# Patient Record
Sex: FEMALE | Race: BLACK OR AFRICAN AMERICAN | Employment: UNEMPLOYED | URBAN - METROPOLITAN AREA
[De-identification: names, ages, dates, MRNs, and addresses within clinical notes are randomized per-mention and may not be internally consistent; named-entity substitution may affect disease eponyms.]

---

## 2020-05-12 ENCOUNTER — APPOINTMENT (OUTPATIENT)
Dept: GENERAL RADIOLOGY | Age: 25
DRG: 053 | End: 2020-05-12
Attending: EMERGENCY MEDICINE
Payer: MEDICAID

## 2020-05-12 ENCOUNTER — HOSPITAL ENCOUNTER (INPATIENT)
Age: 25
LOS: 5 days | Discharge: HOME | DRG: 053 | End: 2020-05-17
Attending: EMERGENCY MEDICINE | Admitting: HOSPITALIST
Payer: MEDICAID

## 2020-05-12 ENCOUNTER — APPOINTMENT (OUTPATIENT)
Dept: CT IMAGING | Age: 25
DRG: 053 | End: 2020-05-12
Attending: EMERGENCY MEDICINE
Payer: MEDICAID

## 2020-05-12 DIAGNOSIS — G40.909 SEIZURE DISORDER (HCC): Primary | ICD-10-CM

## 2020-05-12 DIAGNOSIS — A41.9 SEPSIS, DUE TO UNSPECIFIED ORGANISM, UNSPECIFIED WHETHER ACUTE ORGAN DYSFUNCTION PRESENT (HCC): ICD-10-CM

## 2020-05-12 DIAGNOSIS — G40.919 BREAKTHROUGH SEIZURE (HCC): ICD-10-CM

## 2020-05-12 LAB
ALBUMIN SERPL-MCNC: 4.3 G/DL (ref 3.4–5)
ALBUMIN/GLOB SERPL: 1.2 {RATIO} (ref 0.8–1.7)
ALP SERPL-CCNC: 71 U/L (ref 45–117)
ALT SERPL-CCNC: 31 U/L (ref 13–56)
AMPHET UR QL SCN: NEGATIVE
ANION GAP SERPL CALC-SCNC: 12 MMOL/L (ref 3–18)
APAP SERPL-MCNC: <2 UG/ML (ref 10–30)
APPEARANCE CSF: COLORLESS
APPEARANCE UR: ABNORMAL
AST SERPL-CCNC: 94 U/L (ref 10–38)
BACTERIA URNS QL MICRO: ABNORMAL /HPF
BARBITURATES UR QL SCN: NEGATIVE
BASOPHILS # BLD: 0 K/UL (ref 0–0.1)
BASOPHILS NFR BLD: 0 % (ref 0–2)
BENZODIAZ UR QL: NEGATIVE
BILIRUB SERPL-MCNC: 0.6 MG/DL (ref 0.2–1)
BILIRUB UR QL: NEGATIVE
BUN SERPL-MCNC: 16 MG/DL (ref 7–18)
BUN/CREAT SERPL: 13 (ref 12–20)
CALCIUM SERPL-MCNC: 8.9 MG/DL (ref 8.5–10.1)
CANNABINOIDS UR QL SCN: POSITIVE
CHLORIDE SERPL-SCNC: 110 MMOL/L (ref 100–111)
CK MB CFR SERPL CALC: 0.1 % (ref 0–4)
CK MB SERPL-MCNC: 6.5 NG/ML (ref 5–25)
CK SERPL-CCNC: ABNORMAL U/L (ref 26–192)
CO2 SERPL-SCNC: 18 MMOL/L (ref 21–32)
COCAINE UR QL SCN: NEGATIVE
COLOR CSF: CLEAR
COLOR UR: YELLOW
CREAT SERPL-MCNC: 1.28 MG/DL (ref 0.6–1.3)
DIFFERENTIAL METHOD BLD: ABNORMAL
EOSINOPHIL # BLD: 0 K/UL (ref 0–0.4)
EOSINOPHIL NFR BLD: 0 % (ref 0–5)
EPITH CASTS URNS QL MICRO: ABNORMAL /LPF (ref 0–5)
ERYTHROCYTE [DISTWIDTH] IN BLOOD BY AUTOMATED COUNT: 14.3 % (ref 11.6–14.5)
ETHANOL SERPL-MCNC: <3 MG/DL (ref 0–3)
GLOBULIN SER CALC-MCNC: 3.6 G/DL (ref 2–4)
GLUCOSE CSF-MCNC: 75 MG/DL (ref 40–70)
GLUCOSE SERPL-MCNC: 84 MG/DL (ref 74–99)
GLUCOSE UR STRIP.AUTO-MCNC: NEGATIVE MG/DL
HCG SERPL QL: NEGATIVE
HCT VFR BLD AUTO: 41.9 % (ref 35–45)
HDSCOM,HDSCOM: ABNORMAL
HGB BLD-MCNC: 13.1 G/DL (ref 12–16)
HGB UR QL STRIP: ABNORMAL
KETONES UR QL STRIP.AUTO: 15 MG/DL
LACTATE SERPL-SCNC: 2.2 MMOL/L (ref 0.4–2)
LACTATE SERPL-SCNC: 3.1 MMOL/L (ref 0.4–2)
LEUKOCYTE ESTERASE UR QL STRIP.AUTO: NEGATIVE
LYMPHOCYTES # BLD: 2.8 K/UL (ref 0.9–3.6)
LYMPHOCYTES NFR BLD: 10 % (ref 21–52)
MCH RBC QN AUTO: 29 PG (ref 24–34)
MCHC RBC AUTO-ENTMCNC: 31.3 G/DL (ref 31–37)
MCV RBC AUTO: 92.7 FL (ref 74–97)
METHADONE UR QL: NEGATIVE
MONOCYTES # BLD: 0.9 K/UL (ref 0.05–1.2)
MONOCYTES NFR BLD: 3 % (ref 3–10)
MUCOUS THREADS URNS QL MICRO: ABNORMAL /LPF
NEUTS SEG # BLD: 24.3 K/UL (ref 1.8–8)
NEUTS SEG NFR BLD: 87 % (ref 40–73)
NITRITE UR QL STRIP.AUTO: NEGATIVE
OPIATES UR QL: NEGATIVE
PCP UR QL: NEGATIVE
PH UR STRIP: 5 [PH] (ref 5–8)
PLATELET # BLD AUTO: 346 K/UL (ref 135–420)
PMV BLD AUTO: 9.8 FL (ref 9.2–11.8)
POTASSIUM SERPL-SCNC: 4.2 MMOL/L (ref 3.5–5.5)
PROT CSF-MCNC: 43 MG/DL (ref 15–45)
PROT SERPL-MCNC: 7.9 G/DL (ref 6.4–8.2)
PROT UR STRIP-MCNC: ABNORMAL MG/DL
RBC # BLD AUTO: 4.52 M/UL (ref 4.2–5.3)
RBC # CSF: 40 /CU MM
RBC #/AREA URNS HPF: ABNORMAL /HPF (ref 0–5)
SALICYLATES SERPL-MCNC: 2.7 MG/DL (ref 2.8–20)
SODIUM SERPL-SCNC: 140 MMOL/L (ref 136–145)
SP GR UR REFRACTOMETRY: 1.01 (ref 1–1.03)
TROPONIN I SERPL-MCNC: 0.21 NG/ML (ref 0–0.04)
TUBE # CSF: 1
TUBE # CSF: 1
TUBE # CSF: 3
URATE CRY URNS QL MICRO: ABNORMAL
UROBILINOGEN UR QL STRIP.AUTO: 0.2 EU/DL (ref 0.2–1)
WBC # BLD AUTO: 28 K/UL (ref 4.6–13.2)
WBC # CSF: 1 /CU MM
WBC URNS QL MICRO: ABNORMAL /HPF (ref 0–4)

## 2020-05-12 PROCEDURE — 71045 X-RAY EXAM CHEST 1 VIEW: CPT

## 2020-05-12 PROCEDURE — 87635 SARS-COV-2 COVID-19 AMP PRB: CPT

## 2020-05-12 PROCEDURE — 96366 THER/PROPH/DIAG IV INF ADDON: CPT

## 2020-05-12 PROCEDURE — 87086 URINE CULTURE/COLONY COUNT: CPT

## 2020-05-12 PROCEDURE — 87205 SMEAR GRAM STAIN: CPT

## 2020-05-12 PROCEDURE — 80053 COMPREHEN METABOLIC PANEL: CPT

## 2020-05-12 PROCEDURE — 70450 CT HEAD/BRAIN W/O DYE: CPT

## 2020-05-12 PROCEDURE — 87040 BLOOD CULTURE FOR BACTERIA: CPT

## 2020-05-12 PROCEDURE — 65660000001 HC RM ICU INTERMED STEPDOWN

## 2020-05-12 PROCEDURE — 84703 CHORIONIC GONADOTROPIN ASSAY: CPT

## 2020-05-12 PROCEDURE — 84157 ASSAY OF PROTEIN OTHER: CPT

## 2020-05-12 PROCEDURE — 009U3ZX DRAINAGE OF SPINAL CANAL, PERCUTANEOUS APPROACH, DIAGNOSTIC: ICD-10-PCS | Performed by: INTERNAL MEDICINE

## 2020-05-12 PROCEDURE — 82550 ASSAY OF CK (CPK): CPT

## 2020-05-12 PROCEDURE — 83605 ASSAY OF LACTIC ACID: CPT

## 2020-05-12 PROCEDURE — 80177 DRUG SCRN QUAN LEVETIRACETAM: CPT

## 2020-05-12 PROCEDURE — 96376 TX/PRO/DX INJ SAME DRUG ADON: CPT

## 2020-05-12 PROCEDURE — 96375 TX/PRO/DX INJ NEW DRUG ADDON: CPT

## 2020-05-12 PROCEDURE — 80307 DRUG TEST PRSMV CHEM ANLYZR: CPT

## 2020-05-12 PROCEDURE — 77003 FLUOROGUIDE FOR SPINE INJECT: CPT

## 2020-05-12 PROCEDURE — 96372 THER/PROPH/DIAG INJ SC/IM: CPT

## 2020-05-12 PROCEDURE — 74011000258 HC RX REV CODE- 258: Performed by: EMERGENCY MEDICINE

## 2020-05-12 PROCEDURE — 93005 ELECTROCARDIOGRAM TRACING: CPT

## 2020-05-12 PROCEDURE — 96361 HYDRATE IV INFUSION ADD-ON: CPT

## 2020-05-12 PROCEDURE — 74011000250 HC RX REV CODE- 250: Performed by: EMERGENCY MEDICINE

## 2020-05-12 PROCEDURE — 74011250636 HC RX REV CODE- 250/636: Performed by: HOSPITALIST

## 2020-05-12 PROCEDURE — 74011000258 HC RX REV CODE- 258: Performed by: HOSPITALIST

## 2020-05-12 PROCEDURE — 81001 URINALYSIS AUTO W/SCOPE: CPT

## 2020-05-12 PROCEDURE — 74011250637 HC RX REV CODE- 250/637: Performed by: EMERGENCY MEDICINE

## 2020-05-12 PROCEDURE — 74011250636 HC RX REV CODE- 250/636: Performed by: EMERGENCY MEDICINE

## 2020-05-12 PROCEDURE — 82945 GLUCOSE OTHER FLUID: CPT

## 2020-05-12 PROCEDURE — 99285 EMERGENCY DEPT VISIT HI MDM: CPT

## 2020-05-12 PROCEDURE — 85025 COMPLETE CBC W/AUTO DIFF WBC: CPT

## 2020-05-12 PROCEDURE — 96365 THER/PROPH/DIAG IV INF INIT: CPT

## 2020-05-12 PROCEDURE — 89050 BODY FLUID CELL COUNT: CPT

## 2020-05-12 RX ORDER — LIDOCAINE HYDROCHLORIDE 10 MG/ML
10 INJECTION, SOLUTION EPIDURAL; INFILTRATION; INTRACAUDAL; PERINEURAL
Status: COMPLETED | OUTPATIENT
Start: 2020-05-12 | End: 2020-05-12

## 2020-05-12 RX ORDER — LORAZEPAM 2 MG/ML
2 INJECTION INTRAMUSCULAR
Status: COMPLETED | OUTPATIENT
Start: 2020-05-12 | End: 2020-05-12

## 2020-05-12 RX ORDER — DEXTROSE MONOHYDRATE AND SODIUM CHLORIDE 5; .45 G/100ML; G/100ML
75 INJECTION, SOLUTION INTRAVENOUS CONTINUOUS
Status: CANCELLED | OUTPATIENT
Start: 2020-05-12

## 2020-05-12 RX ORDER — HEPARIN SODIUM 1000 [USP'U]/ML
4000 INJECTION, SOLUTION INTRAVENOUS; SUBCUTANEOUS ONCE
Status: DISCONTINUED | OUTPATIENT
Start: 2020-05-12 | End: 2020-05-12

## 2020-05-12 RX ORDER — VANCOMYCIN 2 GRAM/500 ML IN 0.9 % SODIUM CHLORIDE INTRAVENOUS
2000 ONCE
Status: COMPLETED | OUTPATIENT
Start: 2020-05-12 | End: 2020-05-12

## 2020-05-12 RX ORDER — LORAZEPAM 2 MG/ML
INJECTION INTRAMUSCULAR
Status: DISPENSED
Start: 2020-05-12 | End: 2020-05-12

## 2020-05-12 RX ORDER — SODIUM CHLORIDE 0.9 % (FLUSH) 0.9 %
5-40 SYRINGE (ML) INJECTION AS NEEDED
Status: DISCONTINUED | OUTPATIENT
Start: 2020-05-12 | End: 2020-05-14 | Stop reason: SDUPTHER

## 2020-05-12 RX ORDER — LORAZEPAM 2 MG/ML
1 INJECTION INTRAMUSCULAR
Status: COMPLETED | OUTPATIENT
Start: 2020-05-12 | End: 2020-05-12

## 2020-05-12 RX ORDER — ACETAMINOPHEN 325 MG/1
650 TABLET ORAL
Status: DISCONTINUED | OUTPATIENT
Start: 2020-05-12 | End: 2020-05-17 | Stop reason: HOSPADM

## 2020-05-12 RX ORDER — ACETAMINOPHEN 650 MG/1
650 SUPPOSITORY RECTAL
Status: COMPLETED | OUTPATIENT
Start: 2020-05-12 | End: 2020-05-12

## 2020-05-12 RX ORDER — HEPARIN SODIUM 10000 [USP'U]/100ML
12-25 INJECTION, SOLUTION INTRAVENOUS
Status: DISCONTINUED | OUTPATIENT
Start: 2020-05-12 | End: 2020-05-12

## 2020-05-12 RX ORDER — LORAZEPAM 2 MG/ML
4 INJECTION INTRAMUSCULAR
Status: DISCONTINUED | OUTPATIENT
Start: 2020-05-12 | End: 2020-05-17 | Stop reason: HOSPADM

## 2020-05-12 RX ORDER — SODIUM CHLORIDE 0.9 % (FLUSH) 0.9 %
5-40 SYRINGE (ML) INJECTION EVERY 8 HOURS
Status: DISCONTINUED | OUTPATIENT
Start: 2020-05-12 | End: 2020-05-17 | Stop reason: HOSPADM

## 2020-05-12 RX ORDER — ENOXAPARIN SODIUM 100 MG/ML
40 INJECTION SUBCUTANEOUS EVERY 24 HOURS
Status: DISCONTINUED | OUTPATIENT
Start: 2020-05-12 | End: 2020-05-17 | Stop reason: HOSPADM

## 2020-05-12 RX ORDER — DEXTROSE MONOHYDRATE AND SODIUM CHLORIDE 5; .9 G/100ML; G/100ML
150 INJECTION, SOLUTION INTRAVENOUS CONTINUOUS
Status: DISCONTINUED | OUTPATIENT
Start: 2020-05-12 | End: 2020-05-17 | Stop reason: HOSPADM

## 2020-05-12 RX ORDER — SODIUM CHLORIDE 0.9 % (FLUSH) 0.9 %
5-10 SYRINGE (ML) INJECTION AS NEEDED
Status: DISCONTINUED | OUTPATIENT
Start: 2020-05-12 | End: 2020-05-17 | Stop reason: HOSPADM

## 2020-05-12 RX ORDER — DEXAMETHASONE SODIUM PHOSPHATE 4 MG/ML
10 INJECTION, SOLUTION INTRA-ARTICULAR; INTRALESIONAL; INTRAMUSCULAR; INTRAVENOUS; SOFT TISSUE
Status: COMPLETED | OUTPATIENT
Start: 2020-05-12 | End: 2020-05-12

## 2020-05-12 RX ADMIN — LIDOCAINE HYDROCHLORIDE 5 ML: 10 INJECTION, SOLUTION EPIDURAL; INFILTRATION; INTRACAUDAL; PERINEURAL at 17:25

## 2020-05-12 RX ADMIN — LORAZEPAM 1 MG: 2 INJECTION, SOLUTION INTRAMUSCULAR; INTRAVENOUS at 16:06

## 2020-05-12 RX ADMIN — LORAZEPAM 1 MG: 2 INJECTION, SOLUTION INTRAMUSCULAR; INTRAVENOUS at 15:57

## 2020-05-12 RX ADMIN — SODIUM CHLORIDE 1000 ML: 900 INJECTION, SOLUTION INTRAVENOUS at 14:43

## 2020-05-12 RX ADMIN — DEXAMETHASONE SODIUM PHOSPHATE 10 MG: 4 INJECTION, SOLUTION INTRAMUSCULAR; INTRAVENOUS at 14:51

## 2020-05-12 RX ADMIN — WATER 10 MG: 1 INJECTION INTRAMUSCULAR; INTRAVENOUS; SUBCUTANEOUS at 16:29

## 2020-05-12 RX ADMIN — DEXTROSE MONOHYDRATE AND SODIUM CHLORIDE 150 ML/HR: 5; .9 INJECTION, SOLUTION INTRAVENOUS at 18:04

## 2020-05-12 RX ADMIN — LORAZEPAM 2 MG: 2 INJECTION, SOLUTION INTRAMUSCULAR; INTRAVENOUS at 09:17

## 2020-05-12 RX ADMIN — SODIUM CHLORIDE 160 ML: 900 INJECTION, SOLUTION INTRAVENOUS at 14:43

## 2020-05-12 RX ADMIN — LEVETIRACETAM 250 MG: 100 INJECTION INTRAVENOUS at 18:48

## 2020-05-12 RX ADMIN — Medication 10 ML: at 22:34

## 2020-05-12 RX ADMIN — CEFTRIAXONE 2 G: 2 INJECTION, POWDER, FOR SOLUTION INTRAMUSCULAR; INTRAVENOUS at 14:50

## 2020-05-12 RX ADMIN — ACETAMINOPHEN 650 MG: 650 SUPPOSITORY RECTAL at 14:51

## 2020-05-12 RX ADMIN — VANCOMYCIN HYDROCHLORIDE 2000 MG: 10 INJECTION, POWDER, LYOPHILIZED, FOR SOLUTION INTRAVENOUS at 15:00

## 2020-05-12 RX ADMIN — LEVETIRACETAM 1000 MG: 100 INJECTION INTRAVENOUS at 09:26

## 2020-05-12 RX ADMIN — ENOXAPARIN SODIUM 40 MG: 40 INJECTION SUBCUTANEOUS at 23:51

## 2020-05-12 RX ADMIN — SODIUM CHLORIDE 1000 ML: 900 INJECTION, SOLUTION INTRAVENOUS at 18:54

## 2020-05-12 NOTE — PROCEDURES
Vascular & Interventional Radiology Brief Procedure Note    Interventional Radiologist: Cristin Sanchez    Pre-operative Diagnosis:  Seizures. Post-operative Diagnosis: Same as pre-op dx    Procedure(s) Performed:  Flouro guided LP    Anesthesia:  Local     Findings:  Clear colorless csf OP 25. Closing pressure: could not be obtained. Complications: None    Estimated Blood Loss:  minimal    Tubes and Drains: None    Specimens: limited specimen of only 2 cc as patient became agitated with needle in the spine. Procedure aborted prematurely. Condition: Good    Disposition:  Back to ER.  D/w Dr. Tony Yuan: Specimen sent to lab      Marylin Felty, MD  [address] [address]  Vascular & Interventional Radiology  5/12/2020

## 2020-05-12 NOTE — ED PROVIDER NOTES
EMERGENCY DEPARTMENT HISTORY AND PHYSICAL EXAM      Date: 5/12/2020  Patient Name: Prieto Zayas    History of Presenting Illness     Chief Complaint   Patient presents with    Seizure       History Provided By: Patient and EMS    Chief Complaint: Seizure    Additional History (Context): Prieto Zayas is a 22 y.o. female with seizure who presents with seizure at home this morning. History is provided by EMS, as upon arrival to the emergency department patient is postictal.  Per EMS report, patient has a known history of seizure disorder on Keppra and this morning had a generalized tonic-clonic seizure. Family called EMS, who noted that patient remained postictal during her transport. Upon arrival to the emergency department, patient was looking around but not answering questions and in a postictal state. Soon thereafter, she had another generalized tonic-clonic seizure and was given Ativan. PCP: None    Current Facility-Administered Medications   Medication Dose Route Frequency Provider Last Rate Last Dose    levETIRAcetam (KEPPRA) 1,000 mg in 0.9% sodium chloride 100 mL IVPB  1,000 mg IntraVENous Q12H Isa Rogers MD   1,000 mg at 05/12/20 0926    LORazepam (ATIVAN) 2 mg/mL injection             sodium chloride 0.9 % bolus infusion 1,000 mL  1,000 mL IntraVENous ONCE Isa Rogers MD         Current Outpatient Medications   Medication Sig Dispense Refill    levETIRAcetam (KEPPRA) 250 mg tablet Take  by mouth two (2) times a day. Past History     Past Medical History:  Past Medical History:   Diagnosis Date    [facility] [address]     Seizures ([facility] [address]        Past Surgical History:  History reviewed. No pertinent surgical history.     Family History:  Family History   Family history unknown: Yes       Social History:  Social History     Tobacco Use    Smoking status: Never Smoker    Smokeless tobacco: Never Used   Substance Use Topics    Alcohol use: Not Currently    Drug use: Not Currently Allergies:  No Known Allergies      Review of Systems   Review of Systems   Unable to perform ROS: Mental status change       Physical Exam     Vitals:    05/12/20 1045 05/12/20 1115 05/12/20 1130 05/12/20 1334   BP: 128/68 125/54 124/62 110/54   Pulse:    90   Resp:       Temp:       SpO2: 100%   97%     Physical Exam  Vitals signs and nursing note reviewed. Constitutional:       General: She is in acute distress. Appearance: She is well-developed and normal weight. She is not diaphoretic. Comments: Patient arrived to the emergency department in a postictal state, nonverbal however making purposeful movements with her extremities. Has dried blood on her face. HENT:      Head: Normocephalic and atraumatic. Mouth/Throat:      Mouth: Mucous membranes are moist.      Comments: Has some dried blood on her cheek and her left lateral tongue has evidence of a small bite wound with no current active bleeding. Eyes:      Conjunctiva/sclera: Conjunctivae normal.      Pupils: Pupils are equal, round, and reactive to light. Neck:      Musculoskeletal: Normal range of motion and neck supple. Cardiovascular:      Rate and Rhythm: Normal rate and regular rhythm. Heart sounds: Normal heart sounds. No murmur. Pulmonary:      Effort: Pulmonary effort is normal.      Breath sounds: Normal breath sounds. No wheezing or rales. Abdominal:      General: Bowel sounds are normal. There is no distension. Palpations: Abdomen is soft. Tenderness: There is no abdominal tenderness. Musculoskeletal: Normal range of motion. Lymphadenopathy:      Cervical: No cervical adenopathy. Skin:     General: Skin is warm and dry. Capillary Refill: Capillary refill takes less than 2 seconds. Neurological:      Comments: Postictal, nonverbal, moves all extremities symmetrically and responds to painful stimuli.            Diagnostic Study Results     Labs -     Recent Results (from the past 12 hour(s))   CBC WITH AUTOMATED DIFF    Collection Time: 05/12/20  9:36 AM   Result Value Ref Range    WBC 28.0 (H) 4.6 - 13.2 K/uL    RBC 4.52 4.20 - 5.30 M/uL    HGB 13.1 12.0 - 16.0 g/dL    HCT 41.9 35.0 - 45.0 %    MCV 92.7 74.0 - 97.0 FL    MCH 29.0 24.0 - 34.0 PG    MCHC 31.3 31.0 - 37.0 g/dL    RDW 14.3 11.6 - 14.5 %    PLATELET 586 858 - 605 K/uL    MPV 9.8 9.2 - 11.8 FL    NEUTROPHILS 87 (H) 40 - 73 %    LYMPHOCYTES 10 (L) 21 - 52 %    MONOCYTES 3 3 - 10 %    EOSINOPHILS 0 0 - 5 %    BASOPHILS 0 0 - 2 %    ABS. NEUTROPHILS 24.3 (H) 1.8 - 8.0 K/UL    ABS. LYMPHOCYTES 2.8 0.9 - 3.6 K/UL    ABS. MONOCYTES 0.9 0.05 - 1.2 K/UL    ABS. EOSINOPHILS 0.0 0.0 - 0.4 K/UL    ABS. BASOPHILS 0.0 0.0 - 0.1 K/UL    DF AUTOMATED     HCG QL SERUM    Collection Time: 05/12/20  9:36 AM   Result Value Ref Range    HCG, Ql. Negative NEG         Radiologic Studies -   CT HEAD WO CONT    (Results Pending)     CT Results  (Last 48 hours)    None        CXR Results  (Last 48 hours)    None            Medical Decision Making   I am the first provider for this patient. I reviewed the vital signs, available nursing notes, past medical history, past surgical history, family history and social history. Vital Signs-Reviewed the patient's vital signs. Records Reviewed: Nursing Notes and Old Medical Records    ED Course:   As above patient arrived to the emergency department in a postictal state, and soon after arrival had another witnessed generalized tonic-clonic seizure. She was given 2 mg of Ativan which stopped the seizure, and she was observed. She had the expected postictal and post Ativan somnolence, however after about an hour she began to wake up and moved all extremities symmetrically and rolled over in bed. There was a brief period of time where she was a little agitated trying to crawl out of bed, however when she was placed in a position of comfort, she then became calm and went to sleep.   However, at 1:30 PM I noted that she was still very drowsy and somnolent and is having a longer postictal phase than I would anticipate. It certainly could be that the Ativan is still affecting her mental status, however at this time I will broaden my diagnostic studies as well as order a head CT. Disposition:  Pending at time of turnover    2 PM : Pt care transferred to Dr. Bryanna Ayala  ,ED provider. History of patient complaint(s), available diagnostic reports and current treatment plan has been discussed thoroughly. Bedside rounding on patient occured : yes . Intended disposition of patient :  Pending. Pending diagnostics reports and/or labs (please list):  Non-contrast head CT, labs, and monitoring/re-evaluation of patient's mental status and return to baseline. Diagnosis     Clinical Impression:   1. Seizure disorder ([address]    2. Breakthrough seizure ([facility] [address]        Addendum: Prior to turnover, I ordered a rectal temperature check in addition to labs and CT scan. Rectal temperature check showed that the patient is febrile, so I will institute our sepsis protocol and treat her empirically for possible meningitis. Given that the patient has altered mental status, she will need CT scan prior to lumbar puncture. Depending on what her diagnostic evaluation shows, she may also need an EEG. Additionally, the patient is not following commands and continuously is pulling off her monitors and other medical devices, so we will place her in soft restraints. 2:11 PM - I suspect that this patient has an active infection. 2:11 PM - The patient met criteria for severe sepsis at this time.       PROVIDER SEPSIS PHYSICAL EXAM EVAL  Vital signs reviewed (see nursing documentation for further details):  Vitals:    20 1115 20 1130 20 1334 20 1405   BP: 125/54 124/62 110/54    Pulse:   90    Resp:       Temp:    (!) 100.7 °F (38.2 °C)   SpO2:   97%        Cardiac exam:Regular Rate    Pulmonary exam:Normal    Peripheral pulses:Normal    Capillary refill:Normal    Skin exam:pink    Exam performed Jordana Gardiner MD

## 2020-05-12 NOTE — PROGRESS NOTES
5/2/2020  I noted that no PCP is listed in chart. On pt's [employer], there lists PCP David Slade [phone]. Pateint is medicated and asleep- unable to ask her if she see's him. Ramone Chester.  Wisam Wesley, TOMIN  [facility]  [phone], Pager [phone]  [email or web address]

## 2020-05-12 NOTE — ED NOTES
Pt still combative and agitated in restraints. Unable to transport pt for CT at this time. Do not think pt will tolerate CT in current state. Provider made aware, no orders received at this time.

## 2020-05-12 NOTE — ED TRIAGE NOTES
Patient had witnessed seizure at home bit tongue patient arrives post ictal.   Had another seizure on arrival

## 2020-05-12 NOTE — ED NOTES
1400.  Patient now meets sirs criteria with having fever. Previous VS from being post ictal and dehydration. Sepsis order set started. Still no source identified at this time. 1430 patient signed out to me by Dr. Toan Timmons with bedside rounding performed. Concern patient has prolonged postictal state. She is awake moving around the bed but not answering questions appropriately. Does have abrasions to her forehead and nose. Dry mucus membranes. She has known history of seizures had to episodes have received 2 mg Ativan but has not woken up from it. Rectal temp now performed showing low-grade fever 100.7. Therefore Dr. Toan Timmons placed sepsis protocol order set.  1510. Patient reassessed. Still has not gone to CT. Paging IR.  1528. Spoke with IR Dr. Tyler Mcgrath He will assist in getting head CT and performing LP.  1533. Patient reassessed. Told me she needed to use the bathroom. Was awake and looked like she was trying to get out of the bed. Eric Jefferson to bedside to help patient.  1550. Patient reassessed. Sepsis reassessment performed. Elevated lactate secondary to lactic acidosis from her seizures. IV fluids were given. Will reorder second lactate to show improvement.  1600. Spoke with IR. They state they will take her to do the IR lumbar puncture just needs the orders placed. Will give additional sedation to help with the procedure. Patient already had blood cultures and received Rocephin and vancomycin. Patient received IV fluids. Patient will have lumbar puncture performed. Expect patient will admitted after that.    [phone]. Paging hospitalist.  For admission. Eric Jefferson spoke with the mother and states that she has not had any signs or symptoms of URI symptoms. However there have been several deaths in the family. Could be coronavirus. Patient placed in droplet plus precautions. Will admit patient for further evaluation and care. 1619. Eric Jefferson called from [address] [employer]. Patient still not holding still.   Will add addendum of Geodon. EKG done at 1418 read myself as sinus tachycardia at 115 bpm.  No ST elevation. Normal axis. Normal QT interval.      Impression:  Epileptic seizures with prolonged postictal state. Severe sepsis. Rhabdomyolysis with severe dehydration. Admitted in guarded and improved condition. Critical Care Time:  The services I provided to this patient were to treat and/or prevent clinically significant deterioration that could result in the failure of one or more body systems and/or organ systems. Services included the following:  -reviewing nursing notes and old charts  -vital sign assessments  -direct patient care  -medication orders and management  -interpreting and reviewing diagnostic studies/labs  -re-evaluations  -documentation time    Aggregate critical care time was 41 minutes, which includes only time during which I was engaged in work directly related to the patient's care as described above, whether I was at bedside or elsewhere in the Emergency Department. It did not include time spent performing other reported procedures or the services of residents, students, nurses, or advance practice providers.

## 2020-05-12 NOTE — H&P
Internal Medicine History and Physical          Subjective     HPI: Gissell Clinton is a 22 y.o. female with a PMHx of seizure disorder who presented to the ED with seizures. Unfortunately, the patient has been sedated quite heavily and thus unable to obtain ROS thus HPI obtained via ED staff, EMR. Apparently, the patient had seizure at home this morning. She was brought to ED and was postictal. She had another generalized tonic-clonic seizure in ED and given ativan. She remained somnolent but did come around enough to ask to go to bathroom. She was febrile and thus LP was ordered by ED physician. She was not tolerating CT head or LP thus sedated with IV ativan and geodon. She was given IVAB and tested for CoVID-19. There was apparently no history of URI symptoms but \"deaths in the family? \" No evidence of infectious process on preliminary workup. UDS was positive for THC. PMHx:  Past Medical History:   Diagnosis Date    [facility]iz[facility] ([facility] [address]     Seizures ([facility] [address]        PSurgHx:  History reviewed. No pertinent surgical history.     SocialHx:  Social History     Socioeconomic History    Marital status: SINGLE     Spouse name: Not on file    Number of children: Not on file    Years of education: Not on file    Highest education level: Not on file   Occupational History    Not on file   Social Needs    Financial resource strain: Not on file    Food insecurity     Worry: Not on file     Inability: Not on file    Transportation needs     Medical: Not on file     Non-medical: Not on file   Tobacco Use    Smoking status: Never Smoker    Smokeless tobacco: Never Used   Substance and Sexual Activity    Alcohol use: Not Currently    Drug use: Not Currently    Sexual activity: Not on file   Lifestyle    Physical activity     Days per week: Not on file     Minutes per session: Not on file    Stress: Not on file   Relationships    Social connections     Talks on phone: Not on file     Gets together: Not on file Attends Buddhism service: Not on file     Active member of club or organization: Not on file     Attends meetings of clubs or organizations: Not on file     Relationship status: Not on file    Intimate partner violence     Fear of current or ex partner: Not on file     Emotionally abused: Not on file     Physically abused: Not on file     Forced sexual activity: Not on file   Other Topics Concern    Not on file   Social History Narrative    Not on file       Allergies:  No Known Allergies    FamilyHx:  Family History   Family history unknown: Yes       Prior to Admission Medications   Prescriptions Last Dose Informant Patient Reported? Taking?   levETIRAcetam (KEPPRA) 250 mg tablet   Yes No   Sig: Take  by mouth two (2) times a day.       Facility-Administered Medications: None       Review of Systems:  Unable to obtain      Objective      Visit Vitals  /69   Pulse 95   Temp (!) 100.7 °F (38.2 °C)   Resp 21   Wt 72 kg (158 lb 11.7 oz)   SpO2 97%   BMI 24.14 kg/m²       Physical Exam:  General Appearance: NAD, lethargic  HENT: normocephalic/atraumatic, dry mucus membranes  Lungs: CTA with normal respiratory effort  Cardiovascular: RRR, no m/r/g  Abdomen: soft, non-tender, normal bowel sounds  Extremities: no cyanosis, no peripheral edema  Neuro: moves all extremities, no focal deficits  Psych: sedated, slightly agitated    Laboratory Studies:  CMP:   Lab Results   Component Value Date/Time     05/12/2020 02:30 PM    K 4.2 05/12/2020 02:30 PM     05/12/2020 02:30 PM    CO2 18 (L) 05/12/2020 02:30 PM    AGAP 12 05/12/2020 02:30 PM    GLU 84 05/12/2020 02:30 PM    BUN 16 05/12/2020 02:30 PM    CREA 1.28 05/12/2020 02:30 PM    GFRAA >60 05/12/2020 02:30 PM    GFRNA 52 (L) 05/12/2020 02:30 PM    CA 8.9 05/12/2020 02:30 PM    ALB 4.3 05/12/2020 02:30 PM    TP 7.9 05/12/2020 02:30 PM    GLOB 3.6 05/12/2020 02:30 PM    AGRAT 1.2 05/12/2020 02:30 PM    SGOT 94 (H) 05/12/2020 02:30 PM    ALT 31 05/12/2020 02:30 PM     CBC:   Lab Results   Component Value Date/Time    WBC 28.0 (H) 05/12/2020 09:36 AM    HGB 13.1 05/12/2020 09:36 AM    HCT 41.9 05/12/2020 09:36 AM     05/12/2020 09:36 AM       Imaging Reviewed:  Antoni Hernandez Spinal Punc Lumb Dx    Result Date: 5/12/2020  PROCEDURE:  FLUOROSCOPIC GUIDED LUMBAR PUNCTURE INDICATION:  Altered mental status, seizures, and fever _______________________________ TECHNIQUE/FINDINGS: Due to patient's altered mental status, verbal phone consent was obtained with the patient's mother including the risks, benefits, and alternatives and all questions answered. Examination performed with standard aseptic technique. Using fluoroscopy for guidance a lumbar puncture was performed at the L3 level with a 22 gauge spinal needle after local anesthesia. CSF was clear and colorless. Opening pressures were obtained in the left lateral decubitus position. CSF was withdrawn for the requested laboratory evaluation. Total of 2.5 mL of CSF was obtained at which point the patient became agitated and trying to move and get up off of the table during the procedure with the spinal needle in place therefore the procedure was aborted for safety precaution. Closing pressures were not able to be obtained. Standard post procedure pause. Patient tolerated the procedure well and there were no immediate complications. Opening pressure:  25 cm H20. Closing pressure: Was not obtained. Preprocedure timeout:  1655 hours. Radiation dose (reference air kerma):  20.6 mGy. GUIDANCE: Fluoroscopy guidance was used to position (and confirm the position of) the needle. Image(s) saved in PACS: Fluoroscopy _________________________________     IMPRESSION: 1. Successful fluoroscopic guided diagnostic lumbar puncture. However, the procedure had to be terminated prematurely as discussed above. Ct Head Wo Cont    Result Date: 5/12/2020  EXAM: CT head INDICATION: Seizure. COMPARISON: None.  TECHNIQUE: Axial CT imaging of the head was performed without intravenous contrast. Standard multiplanar coronal and sagittal reformatted images were obtained and are included in interpretation. One or more dose reduction techniques were used on this CT: automated exposure control, adjustment of the mAs and/or kVp according to patient size, and iterative reconstruction techniques. The specific techniques used on this CT exam have been documented in the patient's electronic medical record. Digital Imaging and Communications in Medicine (DICOM) format image data are available to nonaffiliated external healthcare facilities or entities on a secure, media free, reciprocally searchable basis with patient authorization for at least a 12-month period after this study. _______________ FINDINGS: BRAIN AND POSTERIOR FOSSA: The sulci, folia, ventricles and basal cisterns are within normal limits for the patient?s age. There is no intracranial hemorrhage, mass effect, or midline shift. There are no areas of abnormal parenchymal attenuation. EXTRA-AXIAL SPACES AND MENINGES: There are no abnormal extra-axial fluid collections. CALVARIUM: Intact. SINUSES: Clear. OTHER: Streak artifact from dental hardware limits evaluation. _______________     IMPRESSION: No acute intracranial abnormalities. Xr Chest Port    Result Date: 5/12/2020  EXAM: XR CHEST PORT CLINICAL INDICATION/HISTORY: Sepsis   > Additional: None. COMPARISON: None. TECHNIQUE: Portable chest _______________ FINDINGS: SUPPORT DEVICES: None. HEART AND MEDIASTINUM: Normal size and contour. Normal pulmonary vasculature. LUNGS AND PLEURAL SPACES: The lungs are well expanded and clear. No focal consolidation, effusion, or pneumothorax. BONY THORAX AND SOFT TISSUES: No acute osseous abnormality. _______________     IMPRESSION: No active cardiopulmonary disease.         Assessment/Plan     Active Hospital Problems    Diagnosis Date Noted    Sepsis ([address] 05/12/2020    Seizures ([facility])     Fever     Acute metabolic encephalopathy     Rhabdomyolysis      - Low suspicion for CoVID-19, but swabbed and sent in ED  - Low suspicion for meningitis, LP pending  - Cont antibx for now  - Cont aggressive IV fluids  - t/c ID consult  - Cont IV keppra  - Keppra labs pending  - Avoid further sedation unless needed  - Cont acceptable home medications for chronic conditions   - DVT protocol    I have personally reviewed all pertinent labs, films and EKGs that have officially resulted. I reviewed available electronic documentation outlining the initial presentation as well as the emergency room physician's encounter.     Roberto Simeon, DO  Internal Medicine, Hospitalist  Pager: [phone] [facility]

## 2020-05-12 NOTE — ED NOTES
Pt resting in no distress but continues to take off monitoring equipment every time placed on bp cuff, pulse ox, and 5 lead

## 2020-05-13 LAB
ANION GAP SERPL CALC-SCNC: 7 MMOL/L (ref 3–18)
ATRIAL RATE: 115 BPM
BASOPHILS # BLD: 0 K/UL (ref 0–0.1)
BASOPHILS NFR BLD: 0 % (ref 0–2)
BUN SERPL-MCNC: 17 MG/DL (ref 7–18)
BUN/CREAT SERPL: 13 (ref 12–20)
CALCIUM SERPL-MCNC: 8.5 MG/DL (ref 8.5–10.1)
CALCULATED P AXIS, ECG09: 66 DEGREES
CALCULATED R AXIS, ECG10: 66 DEGREES
CALCULATED T AXIS, ECG11: 38 DEGREES
CHLORIDE SERPL-SCNC: 117 MMOL/L (ref 100–111)
CK SERPL-CCNC: ABNORMAL U/L (ref 26–192)
CO2 SERPL-SCNC: 19 MMOL/L (ref 21–32)
CREAT SERPL-MCNC: 1.3 MG/DL (ref 0.6–1.3)
DIAGNOSIS, 93000: NORMAL
DIFFERENTIAL METHOD BLD: ABNORMAL
EOSINOPHIL # BLD: 0.2 K/UL (ref 0–0.4)
EOSINOPHIL NFR BLD: 1 % (ref 0–5)
ERYTHROCYTE [DISTWIDTH] IN BLOOD BY AUTOMATED COUNT: 14.4 % (ref 11.6–14.5)
GLUCOSE SERPL-MCNC: 114 MG/DL (ref 74–99)
HCT VFR BLD AUTO: 37.9 % (ref 35–45)
HGB BLD-MCNC: 11.9 G/DL (ref 12–16)
LACTATE SERPL-SCNC: 1.6 MMOL/L (ref 0.4–2)
LYMPHOCYTES # BLD: 1.1 K/UL (ref 0.9–3.6)
LYMPHOCYTES NFR BLD: 6 % (ref 21–52)
MCH RBC QN AUTO: 28.5 PG (ref 24–34)
MCHC RBC AUTO-ENTMCNC: 31.4 G/DL (ref 31–37)
MCV RBC AUTO: 90.7 FL (ref 74–97)
MONOCYTES # BLD: 1.3 K/UL (ref 0.05–1.2)
MONOCYTES NFR BLD: 7 % (ref 3–10)
NEUTS SEG # BLD: 15.6 K/UL (ref 1.8–8)
NEUTS SEG NFR BLD: 86 % (ref 40–73)
P-R INTERVAL, ECG05: 112 MS
PLATELET # BLD AUTO: 238 K/UL (ref 135–420)
PMV BLD AUTO: 10.2 FL (ref 9.2–11.8)
POTASSIUM SERPL-SCNC: 4.6 MMOL/L (ref 3.5–5.5)
Q-T INTERVAL, ECG07: 316 MS
QRS DURATION, ECG06: 72 MS
QTC CALCULATION (BEZET), ECG08: 437 MS
RBC # BLD AUTO: 4.18 M/UL (ref 4.2–5.3)
SODIUM SERPL-SCNC: 143 MMOL/L (ref 136–145)
VENTRICULAR RATE, ECG03: 115 BPM
WBC # BLD AUTO: 18.3 K/UL (ref 4.6–13.2)

## 2020-05-13 PROCEDURE — 36415 COLL VENOUS BLD VENIPUNCTURE: CPT

## 2020-05-13 PROCEDURE — 80048 BASIC METABOLIC PNL TOTAL CA: CPT

## 2020-05-13 PROCEDURE — 74011000258 HC RX REV CODE- 258: Performed by: HOSPITALIST

## 2020-05-13 PROCEDURE — 74011250636 HC RX REV CODE- 250/636: Performed by: HOSPITALIST

## 2020-05-13 PROCEDURE — 74011250637 HC RX REV CODE- 250/637: Performed by: HOSPITALIST

## 2020-05-13 PROCEDURE — 82550 ASSAY OF CK (CPK): CPT

## 2020-05-13 PROCEDURE — 85025 COMPLETE CBC W/AUTO DIFF WBC: CPT

## 2020-05-13 PROCEDURE — 65660000001 HC RM ICU INTERMED STEPDOWN

## 2020-05-13 RX ORDER — LEVETIRACETAM 250 MG/1
250 TABLET ORAL 2 TIMES DAILY
Status: DISCONTINUED | OUTPATIENT
Start: 2020-05-13 | End: 2020-05-17 | Stop reason: HOSPADM

## 2020-05-13 RX ADMIN — SODIUM CHLORIDE 1000 MG: 900 INJECTION, SOLUTION INTRAVENOUS at 02:36

## 2020-05-13 RX ADMIN — Medication 10 ML: at 22:47

## 2020-05-13 RX ADMIN — DEXTROSE MONOHYDRATE AND SODIUM CHLORIDE 150 ML/HR: 5; .9 INJECTION, SOLUTION INTRAVENOUS at 11:36

## 2020-05-13 RX ADMIN — CEFTRIAXONE 2 G: 2 INJECTION, POWDER, FOR SOLUTION INTRAMUSCULAR; INTRAVENOUS at 14:19

## 2020-05-13 RX ADMIN — Medication 10 ML: at 08:06

## 2020-05-13 RX ADMIN — LEVETIRACETAM 250 MG: 250 TABLET ORAL at 17:32

## 2020-05-13 RX ADMIN — CEFTRIAXONE 2 G: 2 INJECTION, POWDER, FOR SOLUTION INTRAMUSCULAR; INTRAVENOUS at 01:45

## 2020-05-13 RX ADMIN — ENOXAPARIN SODIUM 40 MG: 40 INJECTION SUBCUTANEOUS at 22:47

## 2020-05-13 RX ADMIN — SODIUM CHLORIDE 1000 MG: 900 INJECTION, SOLUTION INTRAVENOUS at 14:11

## 2020-05-13 RX ADMIN — Medication 20 ML: at 06:00

## 2020-05-13 RX ADMIN — LEVETIRACETAM 250 MG: 100 INJECTION INTRAVENOUS at 08:00

## 2020-05-13 NOTE — PROGRESS NOTES
conducted an initial consultation and Spiritual Assessment for [facility], who is a 24 y.o.,female. Patients Primary Language is: [place]. According to the patients EMR Holiness Affiliation is: No preference. The reason the Patient came to the hospital is:   Patient Active Problem List    Diagnosis Date Noted    Sepsis ([facility] [address] 05/12/2020    Seizures ([facility] [address]     Fever     Acute metabolic encephalopathy     Rhabdomyolysis         The  provided the following Interventions:   attempted to Initiate a relationship of care and support with patient in room 2702 this morning however found patient was under isolation due to possible droplet plus precautions so visit did not actually happen at this time. Provided information about Spiritual Care Services. Offered prayer and assurance of continued prayers on patients behalf. Assessment:  Patient does not have any Mosque/cultural needs that will affect patients preferences in health care. There are no further spiritual or Mosque issues which require Spiritual Care Services interventions at this time. Plan:  Chaplains will continue to follow and will provide pastoral care on an as needed/requested basis    . Sofia Rush   Spiritual Care   [phone]

## 2020-05-13 NOTE — PROGRESS NOTES
Problem: Discharge Planning  Goal: *Discharge to safe environment  5/13/2020 1005 by Elle Rey RN  Outcome: Resolved/Met  HOME  Reason for Admission:   Sepsis; Seizures                   RUR Score:      8%               Plan for utilizing home health:      Not likely    PCP: First and Last name:  unknown   Name of Practice:    Are you a current patient: Yes/No:    Approximate date of last visit:                     Current Advanced Directive/Advance Care Plan: does not have one                         Transition of Care Plan:                        Pt still sleepy so CM called mother, Conrado Harris. She verified demographics . Pt lives with mother in her home. She states she followed up for epilepsy with a physician last Aug/Sept/Oct but doesn't know nale. She states daughter will tell her she is taking meds correctly and everyday but she is unsure if she actually is. Pt independent with ADL and uses no equipment. Has [facility] . Plan is Home      Patient has designated ____unable currently____________________ to participate in his/her discharge plan and to receive any needed information. Name:   Address:  Phone number:    Care Management Interventions  PCP Verified by CM: No  Mode of Transport at Discharge:  Other (see comment)(family)  Transition of Care Consult (CM Consult): Discharge Planning  Current Support Network: Relative's Home  Confirm Follow Up Transport: Self  The Plan for Transition of Care is Related to the Following Treatment Goals : resolution of acute symptoms   The Patient and/or Patient Representative was Provided with a Choice of Provider and Agrees with the Discharge Plan?: No  Freedom of Choice List was Provided with Basic Dialogue that Supports the Patient's Individualized Plan of Care/Goals, Treatment Preferences and Shares the Quality Data Associated with the Providers?: No   Resource Information Provided?: No  Discharge Location  Discharge Placement: Home

## 2020-05-13 NOTE — PROGRESS NOTES
2113> Patient arrived in the 2702, patient is agitated, restless, patient arrived with 4 point restraint. Patient is not combative, discontinued restraints on both ankles. Informed Aris, ICU manager and Dr. Sandrita Rhoades. Will continue to monitor. 0000> No seizure noted. Patient continues to be post-ictal. Vital signs stable. 0300> Patient is more awake, focusing with her eyes and tracks with eyes but no command following noted. She only says ouch when being moved around. Will continue to monitor.     0322> Discontinued restraints at this time since patient hasn't been trying to pull any of her lined or her nasal trumpet. VSS. Incontinent care completed. 0430> Patient needed to have a bowel movement and got out of bed. Redirected but very strong. Cleaned patient at this time, placed patient back to bed. Stayed with the patient for safety. Bedside and Verbal shift change report given to Arin [address] [place] [address] (oncoming nurse) by Cleo Camargo RN (offgoing nurse). Report included the following information SBAR, Kardex, Intake/Output, MAR, Recent Results and Cardiac Rhythm NSR.     0800> Inserted # 20 PIV on L wrist at this time. Tolerated well. JAYLAN Hinkle made aware.

## 2020-05-13 NOTE — PROGRESS NOTES
Problem: Falls - Risk of  Goal: *Absence of Falls  Description: Document Clydene Bone Fall Risk and appropriate interventions in the flowsheet. Outcome: Progressing Towards Goal  Note: Fall Risk Interventions:       Mentation Interventions: Bed/chair exit alarm    Medication Interventions: Bed/chair exit alarm, Patient to call before getting OOB, Teach patient to arise slowly    Elimination Interventions: Call light in reach, Bed/chair exit alarm, Toileting schedule/hourly rounds              Problem: Pressure Injury - Risk of  Goal: *Prevention of pressure injury  Description: Document Jareth Scale and appropriate interventions in the flowsheet. Outcome: Progressing Towards Goal  Note: Pressure Injury Interventions:       Moisture Interventions: Absorbent underpads    Activity Interventions: Pressure redistribution bed/mattress(bed type), Increase time out of bed         Nutrition Interventions: Document food/fluid/supplement intake     Maintained bed in the lowest position, bed alarm on.                  Problem: Seizure Disorder (Adult)  Goal: *STG: Remains free of seizure activity  Outcome: Progressing Towards Goal     Problem: Seizure Disorder (Adult)  Goal: *STG: Maintains lab values within therapeutic range  Outcome: Progressing Towards Goal     Problem: Seizure Disorder (Adult)  Goal: *STG/LTG: Complies with medication therapy  Outcome: Progressing Towards Goal

## 2020-05-13 NOTE — PROGRESS NOTES
Internal Medicine Progress Note    Patient's Name: Jose Angel Hart Date: 5/12/2020  Length of Stay: 1      Assessment/Plan     Active Hospital Problems    Diagnosis Date Noted    Sepsis ([facility] [address] 05/12/2020    Seizures ([facility] [address]     Fever     Acute metabolic encephalopathy     Rhabdomyolysis      - No further seizures  - Keppra levels pending  - Cont Keppra  - Low suspicion for CoVID-19, but ordered in ED, awaiting results  - Afebrile overnight, CBC pending  - CSF is not consistent with infection, unlikely meningitis  - Suspect fever/white count more likely result of seizure  - If cultures remain neg, will d/c antibx  - Cont aggressive IV fluids for rhabdo  - Trend CPK  - Cont acceptable home medications for chronic conditions   - DVT protocol    I have personally reviewed all pertinent labs and films that have officially resulted over the last 24 hours. I have personally checked for all pending labs that are awaiting final results.     Subjective     Pt s/e @ bedside  No major events overnight  Afebrile overnight  Doing better, no seizures overnight  Denies CP or SOB    Objective     Visit Vitals  /78   Pulse 86   Temp 98.1 °F (36.7 °C)   Resp 23   Wt 72 kg (158 lb 11.7 oz)   SpO2 95%   BMI 24.14 kg/m²       Physical Exam:  General Appearance: NAD, conversant  Lungs: CTA with normal respiratory effort  CV: RRR, no m/r/g  Abdomen: soft, non-tender, normal bowel sounds  Extremities: no cyanosis, no peripheral edema  Neuro: No focal deficits, motor/sensory intact    Lab/Data Reviewed:  BMP:   Lab Results   Component Value Date/Time     05/13/2020 05:25 AM    K 4.6 05/13/2020 05:25 AM     (H) 05/13/2020 05:25 AM    CO2 19 (L) 05/13/2020 05:25 AM    AGAP 7 05/13/2020 05:25 AM     (H) 05/13/2020 05:25 AM    BUN 17 05/13/2020 05:25 AM    CREA 1.30 05/13/2020 05:25 AM    GFRAA >60 05/13/2020 05:25 AM    GFRNA 51 (L) 05/13/2020 05:25 AM     CBC: No results found for: WBC, HGB, HGBEXT, HCT, HCTEXT, PLT, PLTEXT, HGBEXT, HCTEXT, PLTEXT    Imaging Reviewed:  Xr Spinal Punc Lumb Dx    Result Date: 5/12/2020  PROCEDURE:  FLUOROSCOPIC GUIDED LUMBAR PUNCTURE INDICATION:  Altered mental status, seizures, and fever _______________________________ TECHNIQUE/FINDINGS: Due to patient's altered mental status, verbal phone consent was obtained with the patient's mother including the risks, benefits, and alternatives and all questions answered. Examination performed with standard aseptic technique. Using fluoroscopy for guidance a lumbar puncture was performed at the L3 level with a 22 gauge spinal needle after local anesthesia. CSF was clear and colorless. Opening pressures were obtained in the left lateral decubitus position. CSF was withdrawn for the requested laboratory evaluation. Total of 2.5 mL of CSF was obtained at which point the patient became agitated and trying to move and get up off of the table during the procedure with the spinal needle in place therefore the procedure was aborted for safety precaution. Closing pressures were not able to be obtained. Standard post procedure pause. Patient tolerated the procedure well and there were no immediate complications. Opening pressure:  25 cm H20. Closing pressure: Was not obtained. Preprocedure timeout:  1655 hours. Radiation dose (reference air kerma):  20.6 mGy. GUIDANCE: Fluoroscopy guidance was used to position (and confirm the position of) the needle. Image(s) saved in PACS: Fluoroscopy _________________________________     IMPRESSION: 1. Successful fluoroscopic guided diagnostic lumbar puncture. However, the procedure had to be terminated prematurely as discussed above. Ct Head Wo Cont    Result Date: 5/12/2020  EXAM: CT head INDICATION: Seizure. COMPARISON: None.  TECHNIQUE: Axial CT imaging of the head was performed without intravenous contrast. Standard multiplanar coronal and sagittal reformatted images were obtained and are included in interpretation. One or more dose reduction techniques were used on this CT: automated exposure control, adjustment of the mAs and/or kVp according to patient size, and iterative reconstruction techniques. The specific techniques used on this CT exam have been documented in the patient's electronic medical record. Digital Imaging and Communications in Medicine (DICOM) format image data are available to nonaffiliated external healthcare facilities or entities on a secure, media free, reciprocally searchable basis with patient authorization for at least a 12-month period after this study. _______________ FINDINGS: BRAIN AND POSTERIOR FOSSA: The sulci, folia, ventricles and basal cisterns are within normal limits for the patient?s age. There is no intracranial hemorrhage, mass effect, or midline shift. There are no areas of abnormal parenchymal attenuation. EXTRA-AXIAL SPACES AND MENINGES: There are no abnormal extra-axial fluid collections. CALVARIUM: Intact. SINUSES: Clear. OTHER: Streak artifact from dental hardware limits evaluation. _______________     IMPRESSION: No acute intracranial abnormalities. Xr Chest Port    Result Date: 5/12/2020  EXAM: XR CHEST PORT CLINICAL INDICATION/HISTORY: Sepsis   > Additional: None. COMPARISON: None. TECHNIQUE: Portable chest _______________ FINDINGS: SUPPORT DEVICES: None. HEART AND MEDIASTINUM: Normal size and contour. Normal pulmonary vasculature. LUNGS AND PLEURAL SPACES: The lungs are well expanded and clear. No focal consolidation, effusion, or pneumothorax. BONY THORAX AND SOFT TISSUES: No acute osseous abnormality. _______________     IMPRESSION: No active cardiopulmonary disease.       Medications Reviewed:  Current Facility-Administered Medications   Medication Dose Route Frequency    influenza vaccine 2019-20 (6 mos+)(PF) (FLUARIX/FLULAVAL/FLUZONE QUAD) injection 0.5 mL  0.5 mL IntraMUSCular PRIOR TO DISCHARGE    levETIRAcetam (KEPPRA) tablet 250 mg  250 mg Oral BID    sodium chloride (NS) flush 5-10 mL  5-10 mL IntraVENous PRN    cefTRIAXone (ROCEPHIN) 2 g in 0.9% sodium chloride (MBP/ADV) 50 mL MBP  2 g IntraVENous Q12H    acetaminophen (TYLENOL) tablet 650 mg  650 mg Oral Q4H PRN    enoxaparin (LOVENOX) injection 40 mg  40 mg SubCUTAneous Q24H    dextrose 5% and 0.9% NaCl infusion  150 mL/hr IntraVENous CONTINUOUS    sodium chloride (NS) flush 5-40 mL  5-40 mL IntraVENous Q8H    sodium chloride (NS) flush 5-40 mL  5-40 mL IntraVENous PRN    LORazepam (ATIVAN) injection 4 mg  4 mg IntraVENous Q10MIN PRN    [START ON 5/14/2020] VANCOMYCIN INFORMATION NOTE   Other ONCE    VANCOMYCIN INFORMATION NOTE 1 Each  1 Each Other Rx Dosing/Monitoring    vancomycin (VANCOCIN) 1,000 mg in 0.9% sodium chloride (MBP/ADV) 250 mL adv  1,000 mg IntraVENous Q12H           Adam Henderson DO  Internal Medicine, Hospitalist  Pager: [phone]  [address] [place] [address]

## 2020-05-13 NOTE — ED NOTES
TRANSFER - ED to INPATIENT REPORT:    Verbal report given to Shabana(name) on Angelo Ort  being transferred to 2700(unit) for routine progression of care       Report consisted of patients Situation, Background, Assessment and   Recommendations(SBAR). SBAR report made available to receiving floor on this patient being transferred to  [phone] /2800)  for routine progression of care       Admitting diagnosis Sepsis ([facility] [address] [A41.9]    Information from the following report(s) SBAR was made available to receiving floor. Lines:   Peripheral IV 05/12/20 Left Antecubital (Active)   Site Assessment Clean, dry, & intact 5/12/2020  8:48 AM       Peripheral IV 05/12/20 Right Antecubital (Active)   Site Assessment Clean, dry, & intact 5/12/2020  2:34 PM   Phlebitis Assessment 0 5/12/2020  2:34 PM   Infiltration Assessment 0 5/12/2020  2:34 PM   Dressing Status Clean, dry, & intact 5/12/2020  2:34 PM   Dressing Type Transparent 5/12/2020  2:34 PM   Hub Color/Line Status Blue;Patent; Flushed 5/12/2020  2:34 PM        HCG Status for ALL Females under 53 y/o: YES     Medication list unable to confirm    Opportunity for questions and clarification was provided.       Patient is disoriented ON ISOLATION PRECAUTIONS FOR COVID RULE OUT  Patient is  incontinent and non-ambulatory     Valuables transported with patient     Patient transported with:   Monitor  Registered Nurse  Tech    MAP (Monitor): 78 =Monitored (most recent)  Vitals w/ MEWS Score (last day)     Date/Time MEWS Score Pulse Resp Temp BP Level of Consciousness SpO2    05/12/20 1900    78  17    122/64    100 %    05/12/20 1845    80  19    133/70    100 %    05/12/20 1830    83      126/67    100 %    05/12/20 1815    77  22    127/65    100 %    05/12/20 1800    79  21    130/63    100 %    05/12/20 17:54:22    84  15      (!) Confused or Agitated  100 %    05/12/20 1745    83  19    131/72    100 %    05/12/20 1730    85  21    117/65     05/12/20 1715    (!) 113  26    140/85        05/12/20 1710    95  21    118/69        05/12/20 1615    86  22    127/77  (!) Confused or Agitated  97 %    05/12/20 1445    (!) 113      120/82        05/12/20 1430    (!) 112      129/68    97 %    05/12/20 1415    (!) 105      116/63        05/12/20 1405        (!) 100.7 °F (38.2 °C)          05/12/20 13:34:50    90      110/54  (!) Responds to Pain  97 %    05/12/20 1130          124/62        05/12/20 1115          125/54        05/12/20 1045          128/68    100 %    05/12/20 10:31:31              98 %    05/12/20 1030          133/61    100 %    05/12/20 1000    100              05/12/20 0945          149/85        05/12/20 0930              98 %    05/12/20 0915          147/84        05/12/20 0900          139/71        05/12/20 0848  5  (!) 104  24  97.7 °F (36.5 °C)  142/66  (!) Responds to Pain  100 %    05/12/20 0845    80      138/69                  Septic Patients:     Lactic Acid  No results found for: LACPOC (Most recent on top)  Repeat drawn: YES Time:       ALL LACTIC ACIDS GREATER THAN 2 MUST BE REPEATED POC WITHIN 4 HOURS OR PRIOR TO ADMISSION               Total Fluid Bolus initiated and documented on MAR: YES    All ordered antibiotics initiated within first 3 hours of TIME ZERO?   YES

## 2020-05-14 LAB
ANION GAP SERPL CALC-SCNC: 8 MMOL/L (ref 3–18)
BACTERIA SPEC CULT: NORMAL
BASOPHILS # BLD: 0 K/UL (ref 0–0.1)
BASOPHILS NFR BLD: 0 % (ref 0–2)
BUN SERPL-MCNC: 15 MG/DL (ref 7–18)
BUN/CREAT SERPL: 12 (ref 12–20)
CALCIUM SERPL-MCNC: 8.4 MG/DL (ref 8.5–10.1)
CHLORIDE SERPL-SCNC: 113 MMOL/L (ref 100–111)
CK SERPL-CCNC: ABNORMAL U/L (ref 26–192)
CO2 SERPL-SCNC: 20 MMOL/L (ref 21–32)
CREAT SERPL-MCNC: 1.28 MG/DL (ref 0.6–1.3)
DIFFERENTIAL METHOD BLD: ABNORMAL
EOSINOPHIL # BLD: 0.1 K/UL (ref 0–0.4)
EOSINOPHIL NFR BLD: 1 % (ref 0–5)
ERYTHROCYTE [DISTWIDTH] IN BLOOD BY AUTOMATED COUNT: 13.7 % (ref 11.6–14.5)
GLUCOSE SERPL-MCNC: 110 MG/DL (ref 74–99)
HCT VFR BLD AUTO: 36.4 % (ref 35–45)
HGB BLD-MCNC: 11.8 G/DL (ref 12–16)
LEVETIRACETAM SERPL-MCNC: 15.3 UG/ML (ref 10–40)
LYMPHOCYTES # BLD: 1.8 K/UL (ref 0.9–3.6)
LYMPHOCYTES NFR BLD: 17 % (ref 21–52)
MCH RBC QN AUTO: 29.1 PG (ref 24–34)
MCHC RBC AUTO-ENTMCNC: 32.4 G/DL (ref 31–37)
MCV RBC AUTO: 89.9 FL (ref 74–97)
MONOCYTES # BLD: 0.7 K/UL (ref 0.05–1.2)
MONOCYTES NFR BLD: 7 % (ref 3–10)
NEUTS SEG # BLD: 7.7 K/UL (ref 1.8–8)
NEUTS SEG NFR BLD: 75 % (ref 40–73)
PLATELET # BLD AUTO: 233 K/UL (ref 135–420)
PMV BLD AUTO: 10.1 FL (ref 9.2–11.8)
POTASSIUM SERPL-SCNC: 3.4 MMOL/L (ref 3.5–5.5)
RBC # BLD AUTO: 4.05 M/UL (ref 4.2–5.3)
SERVICE CMNT-IMP: NORMAL
SODIUM SERPL-SCNC: 141 MMOL/L (ref 136–145)
WBC # BLD AUTO: 10.3 K/UL (ref 4.6–13.2)

## 2020-05-14 PROCEDURE — 85025 COMPLETE CBC W/AUTO DIFF WBC: CPT

## 2020-05-14 PROCEDURE — 82550 ASSAY OF CK (CPK): CPT

## 2020-05-14 PROCEDURE — 74011250637 HC RX REV CODE- 250/637: Performed by: HOSPITALIST

## 2020-05-14 PROCEDURE — 80048 BASIC METABOLIC PNL TOTAL CA: CPT

## 2020-05-14 PROCEDURE — 74011000258 HC RX REV CODE- 258: Performed by: HOSPITALIST

## 2020-05-14 PROCEDURE — 74011250636 HC RX REV CODE- 250/636: Performed by: HOSPITALIST

## 2020-05-14 PROCEDURE — 36415 COLL VENOUS BLD VENIPUNCTURE: CPT

## 2020-05-14 PROCEDURE — 65660000001 HC RM ICU INTERMED STEPDOWN

## 2020-05-14 RX ADMIN — DEXTROSE MONOHYDRATE AND SODIUM CHLORIDE 150 ML/HR: 5; .9 INJECTION, SOLUTION INTRAVENOUS at 23:55

## 2020-05-14 RX ADMIN — Medication 10 ML: at 06:10

## 2020-05-14 RX ADMIN — LEVETIRACETAM 250 MG: 250 TABLET ORAL at 17:34

## 2020-05-14 RX ADMIN — DEXTROSE MONOHYDRATE AND SODIUM CHLORIDE 150 ML/HR: 5; .9 INJECTION, SOLUTION INTRAVENOUS at 08:09

## 2020-05-14 RX ADMIN — DEXTROSE MONOHYDRATE AND SODIUM CHLORIDE 150 ML/HR: 5; .9 INJECTION, SOLUTION INTRAVENOUS at 15:29

## 2020-05-14 RX ADMIN — Medication 10 ML: at 23:01

## 2020-05-14 RX ADMIN — ACETAMINOPHEN 650 MG: 325 TABLET, FILM COATED ORAL at 20:56

## 2020-05-14 RX ADMIN — LEVETIRACETAM 250 MG: 250 TABLET ORAL at 08:07

## 2020-05-14 RX ADMIN — SODIUM CHLORIDE 1000 MG: 900 INJECTION, SOLUTION INTRAVENOUS at 03:30

## 2020-05-14 RX ADMIN — ENOXAPARIN SODIUM 40 MG: 40 INJECTION SUBCUTANEOUS at 22:58

## 2020-05-14 RX ADMIN — CEFTRIAXONE 2 G: 2 INJECTION, POWDER, FOR SOLUTION INTRAMUSCULAR; INTRAVENOUS at 02:33

## 2020-05-14 NOTE — PROGRESS NOTES
Problem: Falls - Risk of  Goal: *Absence of Falls  Description: Document Wilbert Zaldivar Fall Risk and appropriate interventions in the flowsheet. Outcome: Progressing Towards Goal  Note: Fall Risk Interventions:       Mentation Interventions: Bed/chair exit alarm, More frequent rounding    Medication Interventions: Patient to call before getting OOB, Bed/chair exit alarm    Elimination Interventions: Toileting schedule/hourly rounds              Problem: Patient Education: Go to Patient Education Activity  Goal: Patient/Family Education  Outcome: Progressing Towards Goal     Problem: Pressure Injury - Risk of  Goal: *Prevention of pressure injury  Description: Document Jareth Scale and appropriate interventions in the flowsheet.   Outcome: Progressing Towards Goal  Note: Pressure Injury Interventions:       Moisture Interventions: Maintain skin hydration (lotion/cream)    Activity Interventions: Increase time out of bed    Mobility Interventions: Pressure redistribution bed/mattress (bed type)    Nutrition Interventions: Document food/fluid/supplement intake                     Problem: Patient Education: Go to Patient Education Activity  Goal: Patient/Family Education  Outcome: Progressing Towards Goal     Problem: Seizure Disorder (Adult)  Goal: *STG: Remains free of seizure activity  Outcome: Progressing Towards Goal  Goal: *STG: Maintains lab values within therapeutic range  Outcome: Progressing Towards Goal  Goal: *STG/LTG: Complies with medication therapy  Outcome: Progressing Towards Goal  Goal: *STG: Remains free of injury during seizure activity  Outcome: Progressing Towards Goal  Goal: *STG: Remains safe in hospital  Outcome: Progressing Towards Goal  Goal: Interventions  Outcome: Progressing Towards Goal     Problem: Patient Education: Go to Patient Education Activity  Goal: Patient/Family Education  Outcome: Progressing Towards Goal     Problem: Pain  Goal: *Control of Pain  Outcome: Progressing Towards Goal

## 2020-05-14 NOTE — DIABETES MGMT
Nutrition:  Pt screened for nutritional status. She is 5'8\" 158 lbs  Ideal wt 140 lbs. Pt is overweight  as evidenced by 113% ideal weight and BMI= 24.1kg/m2. Pt was eating well at the lunch meal today. Pt is well nourished at this time.  Feliz NG

## 2020-05-14 NOTE — PROGRESS NOTES
Problem: Falls - Risk of  Goal: *Absence of Falls  Description: Document Raúl Tyson Fall Risk and appropriate interventions in the flowsheet. Outcome: Progressing Towards Goal  Note: Fall Risk Interventions:       Mentation Interventions: Bed/chair exit alarm, More frequent rounding    Medication Interventions: Patient to call before getting OOB, Bed/chair exit alarm    Elimination Interventions: Toileting schedule/hourly rounds              Problem: Patient Education: Go to Patient Education Activity  Goal: Patient/Family Education  Outcome: Progressing Towards Goal     Problem: Pressure Injury - Risk of  Goal: *Prevention of pressure injury  Description: Document Jareth Scale and appropriate interventions in the flowsheet.   Outcome: Progressing Towards Goal  Note: Pressure Injury Interventions:       Moisture Interventions: Maintain skin hydration (lotion/cream)    Activity Interventions: Increase time out of bed    Mobility Interventions: Pressure redistribution bed/mattress (bed type)    Nutrition Interventions: Document food/fluid/supplement intake                     Problem: Patient Education: Go to Patient Education Activity  Goal: Patient/Family Education  Outcome: Progressing Towards Goal     Problem: Seizure Disorder (Adult)  Goal: *STG: Remains free of seizure activity  Outcome: Progressing Towards Goal  Goal: *STG: Maintains lab values within therapeutic range  Outcome: Progressing Towards Goal  Goal: *STG/LTG: Complies with medication therapy  Outcome: Progressing Towards Goal  Goal: *STG: Remains free of injury during seizure activity  Outcome: Progressing Towards Goal  Goal: *STG: Remains safe in hospital  Outcome: Progressing Towards Goal  Goal: Interventions  Outcome: Progressing Towards Goal     Problem: Patient Education: Go to Patient Education Activity  Goal: Patient/Family Education  Outcome: Progressing Towards Goal     Problem: Pain  Goal: *Control of Pain  Outcome: Progressing Towards Goal

## 2020-05-14 NOTE — PROGRESS NOTES
Internal Medicine Progress Note    Patient's Name: Porsche Bro Date: 5/12/2020  Length of Stay: 2      Assessment/Plan     Active Hospital Problems    Diagnosis Date Noted    Sepsis ([facility] [address] 05/12/2020    Seizures ([facility] [address]     Fever     Acute metabolic encephalopathy     Rhabdomyolysis      - No further seizures  - Keppra levels pending  - Cont PO Keppra  - Low suspicion for CoVID-19, but ordered in ED, awaiting results  - Afebrile overnight, CBC WNL now  - CSF is not consistent with infection, unlikely meningitis  - Suspect fever/white count more likely result of seizure  - Given neg cult, no source of infection, will stop IVAB  - CPK still > 50,000  - Cont aggressive IV fluids for rhabdo  - Trend CPK  - Cont acceptable home medications for chronic conditions   - DVT protocol    I have personally reviewed all pertinent labs and films that have officially resulted over the last 24 hours. I have personally checked for all pending labs that are awaiting final results.     Subjective     Pt s/e @ bedside  No major events overnight  Afebrile overnight  No seizures  Back to baseline mentally  Admits to some mild SOB  Denies CP    Objective     Visit Vitals  BP 99/61   Pulse 81   Temp 97.8 °F (36.6 °C)   Resp 23   Wt 72 kg (158 lb 11.7 oz)   SpO2 97%   BMI 24.14 kg/m²       Physical Exam:  General Appearance: NAD, conversant  Lungs: CTA with normal respiratory effort  CV: RRR, no m/r/g  Abdomen: soft, non-tender, normal bowel sounds  Extremities: no cyanosis, no peripheral edema  Neuro: No focal deficits, motor/sensory intact    Lab/Data Reviewed:  BMP:   Lab Results   Component Value Date/Time     05/14/2020 04:45 AM    K 3.4 (L) 05/14/2020 04:45 AM     (H) 05/14/2020 04:45 AM    CO2 20 (L) 05/14/2020 04:45 AM    AGAP 8 05/14/2020 04:45 AM     (H) 05/14/2020 04:45 AM    BUN 15 05/14/2020 04:45 AM    CREA 1.28 05/14/2020 04:45 AM    GFRAA >60 05/14/2020 04:45 AM    GFRNA 52 (L) 05/14/2020 04:45 AM     CBC:   Lab Results   Component Value Date/Time    WBC 10.3 05/14/2020 11:28 AM    HGB 11.8 (L) 05/14/2020 11:28 AM    HCT 36.4 05/14/2020 11:28 AM     05/14/2020 11:28 AM       Imaging Reviewed:  No results found.     Medications Reviewed:  Current Facility-Administered Medications   Medication Dose Route Frequency    influenza vaccine 2019-20 (6 mos+)(PF) (FLUARIX/FLULAVAL/FLUZONE QUAD) injection 0.5 mL  0.5 mL IntraMUSCular PRIOR TO DISCHARGE    levETIRAcetam (KEPPRA) tablet 250 mg  250 mg Oral BID    sodium chloride (NS) flush 5-10 mL  5-10 mL IntraVENous PRN    cefTRIAXone (ROCEPHIN) 2 g in 0.9% sodium chloride (MBP/ADV) 50 mL MBP  2 g IntraVENous Q12H    acetaminophen (TYLENOL) tablet 650 mg  650 mg Oral Q4H PRN    enoxaparin (LOVENOX) injection 40 mg  40 mg SubCUTAneous Q24H    dextrose 5% and 0.9% NaCl infusion  150 mL/hr IntraVENous CONTINUOUS    sodium chloride (NS) flush 5-40 mL  5-40 mL IntraVENous Q8H    sodium chloride (NS) flush 5-40 mL  5-40 mL IntraVENous PRN    LORazepam (ATIVAN) injection 4 mg  4 mg IntraVENous Q10MIN PRN    VANCOMYCIN INFORMATION NOTE   Other ONCE    VANCOMYCIN INFORMATION NOTE 1 Each  1 Each Other Rx Dosing/Monitoring    vancomycin (VANCOCIN) 1,000 mg in 0.9% sodium chloride (MBP/ADV) 250 mL adv  1,000 mg IntraVENous Q12H           Von Sears DO  Internal Medicine, Hospitalist  Pager: [phone]  [address] [place] [address]

## 2020-05-14 NOTE — ROUTINE PROCESS
Bedside shift change report given to Francisco Muniz RN (oncoming nurse) by Jose Juan Espitia RN (offgoing nurse). Report included the following information SBAR, Kardex, Intake/Output and MAR.

## 2020-05-15 LAB
ANION GAP SERPL CALC-SCNC: 6 MMOL/L (ref 3–18)
BUN SERPL-MCNC: 8 MG/DL (ref 7–18)
BUN/CREAT SERPL: 8 (ref 12–20)
CALCIUM SERPL-MCNC: 8.1 MG/DL (ref 8.5–10.1)
CHLORIDE SERPL-SCNC: 113 MMOL/L (ref 100–111)
CK SERPL-CCNC: ABNORMAL U/L (ref 26–192)
CO2 SERPL-SCNC: 23 MMOL/L (ref 21–32)
CREAT SERPL-MCNC: 1.02 MG/DL (ref 0.6–1.3)
GLUCOSE SERPL-MCNC: 91 MG/DL (ref 74–99)
POTASSIUM SERPL-SCNC: 3.6 MMOL/L (ref 3.5–5.5)
SODIUM SERPL-SCNC: 142 MMOL/L (ref 136–145)

## 2020-05-15 PROCEDURE — 82550 ASSAY OF CK (CPK): CPT

## 2020-05-15 PROCEDURE — 74011250637 HC RX REV CODE- 250/637: Performed by: HOSPITALIST

## 2020-05-15 PROCEDURE — 74011000258 HC RX REV CODE- 258: Performed by: HOSPITALIST

## 2020-05-15 PROCEDURE — 65660000000 HC RM CCU STEPDOWN

## 2020-05-15 PROCEDURE — 74011250636 HC RX REV CODE- 250/636: Performed by: HOSPITALIST

## 2020-05-15 PROCEDURE — 80048 BASIC METABOLIC PNL TOTAL CA: CPT

## 2020-05-15 RX ADMIN — DEXTROSE MONOHYDRATE AND SODIUM CHLORIDE 150 ML/HR: 5; .9 INJECTION, SOLUTION INTRAVENOUS at 15:05

## 2020-05-15 RX ADMIN — ACETAMINOPHEN 650 MG: 325 TABLET, FILM COATED ORAL at 20:49

## 2020-05-15 RX ADMIN — DEXTROSE MONOHYDRATE AND SODIUM CHLORIDE 150 ML/HR: 5; .9 INJECTION, SOLUTION INTRAVENOUS at 21:31

## 2020-05-15 RX ADMIN — Medication 10 ML: at 21:34

## 2020-05-15 RX ADMIN — LEVETIRACETAM 250 MG: 250 TABLET ORAL at 18:03

## 2020-05-15 RX ADMIN — ACETAMINOPHEN 650 MG: 325 TABLET, FILM COATED ORAL at 08:59

## 2020-05-15 RX ADMIN — ENOXAPARIN SODIUM 40 MG: 40 INJECTION SUBCUTANEOUS at 22:38

## 2020-05-15 RX ADMIN — LEVETIRACETAM 250 MG: 250 TABLET ORAL at 08:59

## 2020-05-15 NOTE — PROGRESS NOTES
5/15/2020 Patient is in Covid isolation. She was tested 5/12 but no results in yet that I can see. She is on room air, off iv antibiotics. Her plan is home. [facility].  TOMI PatelN  [facility]  [phone], Pager [phone]  [email or web address]

## 2020-05-15 NOTE — PROGRESS NOTES
Problem: Falls - Risk of  Goal: *Absence of Falls  Description: Document Klarissa Amor Fall Risk and appropriate interventions in the flowsheet. Outcome: Progressing Towards Goal  Note: Fall Risk Interventions:       Mentation Interventions: Bed/chair exit alarm, More frequent rounding    Medication Interventions: Patient to call before getting OOB, Bed/chair exit alarm    Elimination Interventions: Toileting schedule/hourly rounds              Problem: Patient Education: Go to Patient Education Activity  Goal: Patient/Family Education  Outcome: Progressing Towards Goal     Problem: Pressure Injury - Risk of  Goal: *Prevention of pressure injury  Description: Document Jareth Scale and appropriate interventions in the flowsheet.   Outcome: Progressing Towards Goal  Note: Pressure Injury Interventions:       Moisture Interventions: Absorbent underpads    Activity Interventions: Increase time out of bed    Mobility Interventions: Pressure redistribution bed/mattress (bed type)    Nutrition Interventions: Document food/fluid/supplement intake                     Problem: Patient Education: Go to Patient Education Activity  Goal: Patient/Family Education  Outcome: Progressing Towards Goal     Problem: Seizure Disorder (Adult)  Goal: *STG: Remains free of seizure activity  Outcome: Progressing Towards Goal  Goal: *STG: Maintains lab values within therapeutic range  Outcome: Progressing Towards Goal  Goal: *STG/LTG: Complies with medication therapy  Outcome: Progressing Towards Goal  Goal: *STG: Remains free of injury during seizure activity  Outcome: Progressing Towards Goal  Goal: *STG: Remains safe in hospital  Outcome: Progressing Towards Goal  Goal: Interventions  Outcome: Progressing Towards Goal     Problem: Patient Education: Go to Patient Education Activity  Goal: Patient/Family Education  Outcome: Progressing Towards Goal     Problem: Pain  Goal: *Control of Pain  Outcome: Progressing Towards Goal

## 2020-05-15 NOTE — CDMP QUERY
Patient admitted with seiqures. Noted documentation of sepsis in progress notes on 5/14/2020. Please provide clinical indicators/treatment to support this diagnosis. 1.  Sepsis ruled out after study 2. Sepsis 3. Other 4. Unknown/unable to determine The medical record reflects the following:   
   Risk Factors: low suspicion for COVID 19, seizure, acute metabolic encephalopathy Clinical Indicators:  
\"CSF is not consistent with infection, unlikely meningitis - Suspect fever/white count more likely result of seizure - Given neg cult, no source of infection, will stop IVAB\"  Per Dr. Wilma Nash, Progress note, 5/14/2020. Lactice acid:  2.2 and 1.6 on 5/12/2020 WBC:  28 on 5/12/2020 and 20.3 on 5/14/2020 Temp:  100.7 degrees per Dr. Gurmeet Ceballos, ED Provider notes, 5/12/2020 Temp range:  96.1 to 99 degrees during admission Heart rate range:  58 to 113 beats/min during admission Respiratory rate range:   16 to 24 breaths/min during admission Blood pressure:  80/54 to 149/85 during admission Normal saline bolus infusions given IV on 5/12/2020 Treatment:  
Ceftriaxone DC'd Thank you, PRADIP Villatoro RN, CDS [email or web address] 
Cell # for Richard Luna RN, CDS supervisor:  [phone]

## 2020-05-15 NOTE — PROGRESS NOTES
Internal Medicine Progress Note    Patient's Name: Sandra Leiva Date: 5/12/2020  Length of Stay: 3      Assessment/Plan     Active Hospital Problems    Diagnosis Date Noted    Seizures ([address]     Fever     Acute metabolic encephalopathy     Rhabdomyolysis    Sepsis ruled out  SIRS without source of infection    - No further seizures  - Keppra levels WNL  - Cont PO Keppra  - Low suspicion for CoVID-19, but ordered in ED, awaiting results  - Cont to observe off antibx  - CPK still > 50,000  - Cont aggressive IV fluids for rhabdo  - Trend CPK  - Cont acceptable home medications for chronic conditions   - DVT protocol    I have personally reviewed all pertinent labs and films that have officially resulted over the last 24 hours. I have personally checked for all pending labs that are awaiting final results. Subjective     Pt s/e @ bedside  No major events overnight  Afebrile overnight  No seizures  Cont to do well  Still w/ mild SOB  Denies CP    Objective     Visit Vitals  BP 93/64   Pulse 80   Temp 98 °F (36.7 °C)   Resp 24   Ht 5' 8\" (1.727 m)   Wt 72 kg (158 lb 11.7 oz)   SpO2 97%   BMI 24.14 kg/m²       Physical Exam:  General Appearance: NAD, conversant  Lungs: CTA with normal respiratory effort  CV: RRR, no m/r/g  Abdomen: soft, non-tender, normal bowel sounds  Extremities: no cyanosis, no peripheral edema  Neuro: No focal deficits, motor/sensory intact    Lab/Data Reviewed:  BMP:   Lab Results   Component Value Date/Time     05/15/2020 03:15 AM    K 3.6 05/15/2020 03:15 AM     (H) 05/15/2020 03:15 AM    CO2 23 05/15/2020 03:15 AM    AGAP 6 05/15/2020 03:15 AM    GLU 91 05/15/2020 03:15 AM    BUN 8 05/15/2020 03:15 AM    CREA 1.02 05/15/2020 03:15 AM    GFRAA >60 05/15/2020 03:15 AM    GFRNA >60 05/15/2020 03:15 AM     CBC:   No results found for: WBC, HGB, HGBEXT, HCT, HCTEXT, PLT, PLTEXT, HGBEXT, HCTEXT, PLTEXT    Imaging Reviewed:  No results found.     Medications Reviewed:  Current Facility-Administered Medications   Medication Dose Route Frequency    influenza vaccine 2019-20 (6 mos+)(PF) (FLUARIX/FLULAVAL/FLUZONE QUAD) injection 0.5 mL  0.5 mL IntraMUSCular PRIOR TO DISCHARGE    levETIRAcetam (KEPPRA) tablet 250 mg  250 mg Oral BID    sodium chloride (NS) flush 5-10 mL  5-10 mL IntraVENous PRN    acetaminophen (TYLENOL) tablet 650 mg  650 mg Oral Q4H PRN    enoxaparin (LOVENOX) injection 40 mg  40 mg SubCUTAneous Q24H    dextrose 5% and 0.9% NaCl infusion  150 mL/hr IntraVENous CONTINUOUS    sodium chloride (NS) flush 5-40 mL  5-40 mL IntraVENous Q8H    LORazepam (ATIVAN) injection 4 mg  4 mg IntraVENous Q10MIN PRN           Adam Henderson DO  Internal Medicine, Hospitalist  Pager: [phone]  [address] [place] [address]

## 2020-05-16 LAB
ANION GAP SERPL CALC-SCNC: 6 MMOL/L (ref 3–18)
BUN SERPL-MCNC: 4 MG/DL (ref 7–18)
BUN/CREAT SERPL: 5 (ref 12–20)
CALCIUM SERPL-MCNC: 7.8 MG/DL (ref 8.5–10.1)
CHLORIDE SERPL-SCNC: 114 MMOL/L (ref 100–111)
CK SERPL-CCNC: ABNORMAL U/L (ref 26–192)
CO2 SERPL-SCNC: 24 MMOL/L (ref 21–32)
CREAT SERPL-MCNC: 0.81 MG/DL (ref 0.6–1.3)
GLUCOSE SERPL-MCNC: 91 MG/DL (ref 74–99)
POTASSIUM SERPL-SCNC: 3.5 MMOL/L (ref 3.5–5.5)
SARS-COV-2, COV2NT: NOT DETECTED
SODIUM SERPL-SCNC: 144 MMOL/L (ref 136–145)

## 2020-05-16 PROCEDURE — 82550 ASSAY OF CK (CPK): CPT

## 2020-05-16 PROCEDURE — 74011000258 HC RX REV CODE- 258: Performed by: HOSPITALIST

## 2020-05-16 PROCEDURE — 65660000000 HC RM CCU STEPDOWN

## 2020-05-16 PROCEDURE — 74011250636 HC RX REV CODE- 250/636: Performed by: HOSPITALIST

## 2020-05-16 PROCEDURE — 80048 BASIC METABOLIC PNL TOTAL CA: CPT

## 2020-05-16 PROCEDURE — 74011250637 HC RX REV CODE- 250/637: Performed by: HOSPITALIST

## 2020-05-16 RX ADMIN — Medication 10 ML: at 23:35

## 2020-05-16 RX ADMIN — ENOXAPARIN SODIUM 40 MG: 40 INJECTION SUBCUTANEOUS at 23:35

## 2020-05-16 RX ADMIN — DEXTROSE MONOHYDRATE AND SODIUM CHLORIDE 150 ML/HR: 5; .9 INJECTION, SOLUTION INTRAVENOUS at 12:39

## 2020-05-16 RX ADMIN — LEVETIRACETAM 250 MG: 250 TABLET ORAL at 17:36

## 2020-05-16 RX ADMIN — Medication 10 ML: at 14:00

## 2020-05-16 RX ADMIN — LEVETIRACETAM 250 MG: 250 TABLET ORAL at 09:06

## 2020-05-16 NOTE — PROGRESS NOTES
0730 Bedside report given per Nisha Vázquez.  Pt on isolation droplet plus for covid 19   0900 Rounds with Dr. Gibson Bloch  1900 Effective bedside handoff given night shift

## 2020-05-16 NOTE — PROGRESS NOTES
Problem: Falls - Risk of  Goal: *Absence of Falls  Description: Document Lina Akers Fall Risk and appropriate interventions in the flowsheet.   5/16/2020 0124 by Sangeeta Gambino RN  Outcome: Progressing Towards Goal  Note: Fall Risk Interventions:       Mentation Interventions: Adequate sleep, hydration, pain control    Medication Interventions: Patient to call before getting OOB    Elimination Interventions: Call light in reach           5/16/2020 0123 by Sangeeta Gambino RN  Outcome: Progressing Towards Goal  Note: Fall Risk Interventions:       Mentation Interventions: Adequate sleep, hydration, pain control    Medication Interventions: Patient to call before getting OOB    Elimination Interventions: Call light in reach              Problem: Patient Education: Go to Patient Education Activity  Goal: Patient/Family Education  5/16/2020 0124 by Sangeeta Gambino RN  Outcome: Progressing Towards Goal  5/16/2020 0123 by Sangeeta Gambino RN  Outcome: Progressing Towards Goal     Problem: Patient Education: Go to Patient Education Activity  Goal: Patient/Family Education  5/16/2020 0124 by Sangeeta Gambino RN  Outcome: Progressing Towards Goal  5/16/2020 0123 by Sangeeta Gambino RN  Outcome: Progressing Towards Goal     Problem: Seizure Disorder (Adult)  Goal: *STG: Remains free of seizure activity  5/16/2020 0124 by Sangeeta Gambino RN  Outcome: Progressing Towards Goal  5/16/2020 0123 by Sangeeta Gambino RN  Outcome: Progressing Towards Goal  Goal: *STG: Maintains lab values within therapeutic range  5/16/2020 0124 by Sangeeta Gambino RN  Outcome: Progressing Towards Goal  5/16/2020 0123 by Sangeeta Gambino RN  Outcome: Progressing Towards Goal  Goal: *STG/LTG: Complies with medication therapy  5/16/2020 0124 by Sangeeta Gambino RN  Outcome: Progressing Towards Goal  5/16/2020 0123 by Sangeeta Gambino RN  Outcome: Progressing Towards Goal  Goal: *STG: Remains free of injury during seizure activity  5/16/2020 0124 by Sangeeta Gambino RN  Outcome: Progressing Towards Goal  5/16/2020 0123 by Jacquelin Ortiz, RN  Outcome: Progressing Towards Goal  Goal: *STG: Remains safe in hospital  5/16/2020 0124 by Jacquelin Ortiz, RN  Outcome: Progressing Towards Goal  5/16/2020 0123 by Jacquelin Ortiz, RN  Outcome: Progressing Towards Goal  Goal: Interventions  5/16/2020 0124 by Jacquelin Ortiz, RN  Outcome: Progressing Towards Goal  5/16/2020 0123 by Jacquelin Ortiz, RN  Outcome: Progressing Towards Goal     Problem: Patient Education: Go to Patient Education Activity  Goal: Patient/Family Education  5/16/2020 0124 by Jacquelin Ortiz, RN  Outcome: Progressing Towards Goal  5/16/2020 0123 by Jacquelin Ortiz, RN  Outcome: Progressing Towards Goal     Problem: Pain  Goal: *Control of Pain  5/16/2020 0124 by Jacquelin Ortiz, RN  Outcome: Progressing Towards Goal  5/16/2020 0123 by Jacquelin Ortiz, RN  Outcome: Progressing Towards Goal

## 2020-05-16 NOTE — PROGRESS NOTES
0700: Rec'vd report from Nicholas rFagoso RN. Precepting Sophia Tamez RN, we will be performing assessments, care and charting in tandem. Please refer to RN notes for full day's event.

## 2020-05-16 NOTE — PROGRESS NOTES
Problem: Falls - Risk of  Goal: *Absence of Falls  Description: Document [place] Flow Fall Risk and appropriate interventions in the flowsheet.   Outcome: Progressing Towards Goal  Note: Fall Risk Interventions:       Mentation Interventions: Bed/chair exit alarm    Medication Interventions: Bed/chair exit alarm    Elimination Interventions: Call light in reach              Problem: Patient Education: Go to Patient Education Activity  Goal: Patient/Family Education  Outcome: Progressing Towards Goal     Problem: Patient Education: Go to Patient Education Activity  Goal: Patient/Family Education  Outcome: Progressing Towards Goal     Problem: Seizure Disorder (Adult)  Goal: *STG: Remains free of seizure activity  Outcome: Progressing Towards Goal  Goal: *STG: Maintains lab values within therapeutic range  Outcome: Progressing Towards Goal  Goal: *STG/LTG: Complies with medication therapy  Outcome: Progressing Towards Goal  Goal: *STG: Remains free of injury during seizure activity  Outcome: Progressing Towards Goal  Goal: *STG: Remains safe in hospital  Outcome: Progressing Towards Goal     Problem: Patient Education: Go to Patient Education Activity  Goal: Patient/Family Education  Outcome: Progressing Towards Goal     Problem: Pain  Goal: *Control of Pain  Outcome: Progressing Towards Goal

## 2020-05-17 VITALS
RESPIRATION RATE: 18 BRPM | SYSTOLIC BLOOD PRESSURE: 137 MMHG | OXYGEN SATURATION: 99 % | BODY MASS INDEX: 29.25 KG/M2 | WEIGHT: 193 LBS | DIASTOLIC BLOOD PRESSURE: 86 MMHG | HEART RATE: 68 BPM | HEIGHT: 68 IN | TEMPERATURE: 98.3 F

## 2020-05-17 PROBLEM — I21.4 NSTEMI (NON-ST ELEVATED MYOCARDIAL INFARCTION) (HCC): Status: ACTIVE | Noted: 2020-05-17

## 2020-05-17 LAB — CK SERPL-CCNC: ABNORMAL U/L (ref 26–192)

## 2020-05-17 PROCEDURE — 82550 ASSAY OF CK (CPK): CPT

## 2020-05-17 PROCEDURE — 74011250637 HC RX REV CODE- 250/637: Performed by: HOSPITALIST

## 2020-05-17 PROCEDURE — 74011000258 HC RX REV CODE- 258: Performed by: HOSPITALIST

## 2020-05-17 RX ORDER — LEVETIRACETAM 250 MG/1
250 TABLET ORAL 2 TIMES DAILY
Qty: 60 TAB | Refills: 0 | Status: SHIPPED | OUTPATIENT
Start: 2020-05-17 | End: 2020-05-17 | Stop reason: SDUPTHER

## 2020-05-17 RX ORDER — LEVETIRACETAM 250 MG/1
250 TABLET ORAL 2 TIMES DAILY
Qty: 60 TAB | Refills: 1 | OUTPATIENT
Start: 2020-05-17 | End: 2021-02-20

## 2020-05-17 RX ADMIN — LEVETIRACETAM 250 MG: 250 TABLET ORAL at 08:59

## 2020-05-17 RX ADMIN — ACETAMINOPHEN 650 MG: 325 TABLET, FILM COATED ORAL at 08:59

## 2020-05-17 RX ADMIN — Medication 10 ML: at 06:26

## 2020-05-17 RX ADMIN — DEXTROSE MONOHYDRATE AND SODIUM CHLORIDE 150 ML/HR: 5; .9 INJECTION, SOLUTION INTRAVENOUS at 01:36

## 2020-05-17 NOTE — DISCHARGE SUMMARY
Internal Medicine Discharge Summary        Patient: Trista Stephen    YOB: 1995    Age:  22 y.o. Admit Date: 5/12/2020    Discharge Date: 5/17/2020    LOS:  LOS: 5 days     Discharge To: Home    Consults: None    Admission Diagnoses: Sepsis ([facility] [A41.9]    Discharge Diagnoses:    Problem List as of 5/17/2020 Never Reviewed          Codes Class Noted - Resolved    NSTEMI (non-ST elevated myocardial infarction) [facility]) ICD-10-CM: I21.4  ICD-9-CM: 410.70  5/17/2020 - Present        Seizures ([facility] ICD-10-CM: R56.9  ICD-9-CM: 780.39  Unknown - Present        Fever ICD-10-CM: R50.9  ICD-9-CM: 780.60  Unknown - Present        Acute metabolic encephalopathy GUD-30-ZS: G93.41  ICD-9-CM: 348.31  Unknown - Present        * (Principal) Rhabdomyolysis ICD-10-CM: S82.05  ICD-9-CM: 728.88  Unknown - Present              Discharge Condition:  Improved    Procedures: None         HPI: Trista Stephen is a 22 y.o. female with a PMHx of seizure disorder who presented to the ED with seizures. Unfortunately, the patient has been sedated quite heavily and thus unable to obtain ROS thus HPI obtained via ED staff, EMR. Apparently, the patient had seizure at home this morning. She was brought to ED and was postictal. She had another generalized tonic-clonic seizure in ED and given ativan. She remained somnolent but did come around enough to ask to go to bathroom. She was febrile and thus LP was ordered by ED physician. She was not tolerating CT head or LP thus sedated with IV ativan and geodon. She was given IVAB and tested for CoVID-19. There was apparently no history of URI symptoms but \"deaths in the family? \" No evidence of infectious process on preliminary workup. UDS was positive for THC. Hospital Course:    Seizure Disorder - No further seizures during admission. Cont on PO keppra. Keppra levels from admission WNL    SIRS without evidence of infection - No evidence of infection.  Cultures from LP, blood neg. UA and CXR normal. Remained afebrile during duration of admission. IV antibiotics were stopped. CoVID-19 was neg. Rhabdomyolysis - Likely 2/2 seizure + THC use. No renal failure. Treated with aggressive IV fluids. CPK was trending downward at time of admission, although still quite high (41,000). Given she was at baseline, tolerating diet and liquids, can be discharged home with follow up CPK via PCP next week. The rest of the patient's chronic conditions were managed appropriately during their admission. They were medically stable at the time of discharge. Visit Vitals  /86   Pulse 68   Temp 98.3 °F (36.8 °C)   Resp 18   Ht 5' 8\" (1.727 m)   Wt 87.5 kg (193 lb)   SpO2 99%   BMI 29.35 kg/m²       Physical Exam at Discharge:  General Appearance: NAD, conversant  HENT: normocephalic/atraumatic, moist mucus membranes  Lungs: CTA with normal respiratory effort  CV: RRR, no m/r/g  Abdomen: soft, non-tender, normal bowel sounds  Extremities: no cyanosis, no peripheral edema  Neuro: moves all extremities, no focal deficits  Psych: appropriate affect, alert and oriented to person, place and time    Labs Prior to Discharge:  Labs: Results:       Chemistry Recent Labs     05/16/20  0450 05/15/20  0315   GLU 91 91    142   K 3.5 3.6   * 113*   CO2 24 23   BUN 4* 8   CREA 0.81 1.02   CA 7.8* 8.1*   AGAP 6 6   BUCR 5* 8*      CBC w/Diff No results for input(s): WBC, RBC, HGB, HCT, PLT, GRANS, LYMPH, EOS, HGBEXT, HCTEXT, PLTEXT in the last 72 hours. Cardiac Enzymes Recent Labs     05/17/20  0600 05/16/20  0450   CPK 41,460* >50,000*      Coagulation No results for input(s): PTP, INR, APTT, INREXT in the last 72 hours. Lipid Panel No results found for: CHOL, CHOLPOCT, CHOLX, CHLST, CHOLV, 035150, HDL, HDLP, LDL, LDLC, DLDLP, 688936, VLDLC, VLDL, TGLX, TRIGL, TRIGP, TGLPOCT, CHHD, CHHDX   BNP No results for input(s): BNPP in the last 72 hours.    Liver Enzymes No results for input(s): TP, ALB, TBIL, AP, SGOT, GPT in the last 72 hours. No lab exists for component: DBIL   Thyroid Studies No results found for: T4, T3U, TSH, TSHEXT         Significant Imaging:  Xr Spinal Punc Lumb Dx    Result Date: 5/12/2020  PROCEDURE:  FLUOROSCOPIC GUIDED LUMBAR PUNCTURE INDICATION:  Altered mental status, seizures, and fever _______________________________ TECHNIQUE/FINDINGS: Due to patient's altered mental status, verbal phone consent was obtained with the patient's mother including the risks, benefits, and alternatives and all questions answered. Examination performed with standard aseptic technique. Using fluoroscopy for guidance a lumbar puncture was performed at the L3 level with a 22 gauge spinal needle after local anesthesia. CSF was clear and colorless. Opening pressures were obtained in the left lateral decubitus position. CSF was withdrawn for the requested laboratory evaluation. Total of 2.5 mL of CSF was obtained at which point the patient became agitated and trying to move and get up off of the table during the procedure with the spinal needle in place therefore the procedure was aborted for safety precaution. Closing pressures were not able to be obtained. Standard post procedure pause. Patient tolerated the procedure well and there were no immediate complications. Opening pressure:  25 cm H20. Closing pressure: Was not obtained. Preprocedure timeout:  1655 hours. Radiation dose (reference air kerma):  20.6 mGy. GUIDANCE: Fluoroscopy guidance was used to position (and confirm the position of) the needle. Image(s) saved in PACS: Fluoroscopy _________________________________     IMPRESSION: 1. Successful fluoroscopic guided diagnostic lumbar puncture. However, the procedure had to be terminated prematurely as discussed above. Ct Head Wo Cont    Result Date: 5/12/2020  EXAM: CT head INDICATION: Seizure. COMPARISON: None.  TECHNIQUE: Axial CT imaging of the head was performed without intravenous contrast. Standard multiplanar coronal and sagittal reformatted images were obtained and are included in interpretation. One or more dose reduction techniques were used on this CT: automated exposure control, adjustment of the mAs and/or kVp according to patient size, and iterative reconstruction techniques. The specific techniques used on this CT exam have been documented in the patient's electronic medical record. Digital Imaging and Communications in Medicine (DICOM) format image data are available to nonaffiliated external healthcare facilities or entities on a secure, media free, reciprocally searchable basis with patient authorization for at least a 12-month period after this study. _______________ FINDINGS: BRAIN AND POSTERIOR FOSSA: The sulci, folia, ventricles and basal cisterns are within normal limits for the patient?s age. There is no intracranial hemorrhage, mass effect, or midline shift. There are no areas of abnormal parenchymal attenuation. EXTRA-AXIAL SPACES AND MENINGES: There are no abnormal extra-axial fluid collections. CALVARIUM: Intact. SINUSES: Clear. OTHER: Streak artifact from dental hardware limits evaluation. _______________     IMPRESSION: No acute intracranial abnormalities. Xr Chest Port    Result Date: 5/12/2020  EXAM: XR CHEST PORT CLINICAL INDICATION/HISTORY: Sepsis   > Additional: None. COMPARISON: None. TECHNIQUE: Portable chest _______________ FINDINGS: SUPPORT DEVICES: None. HEART AND MEDIASTINUM: Normal size and contour. Normal pulmonary vasculature. LUNGS AND PLEURAL SPACES: The lungs are well expanded and clear. No focal consolidation, effusion, or pneumothorax. BONY THORAX AND SOFT TISSUES: No acute osseous abnormality. _______________     IMPRESSION: No active cardiopulmonary disease.            Discharge Medications:     Current Discharge Medication List      CONTINUE these medications which have CHANGED    Details   levETIRAcetam (Keppra) 250 mg tablet Take 1 Tab by mouth two (2) times a day. Qty: 60 Tab, Refills: 1             Activity: Activity as tolerated    Diet: Resume previous diet    Wound Care: None needed    Follow-up:   Please follow up with your PCP within 7 days to discuss your recent hospitalization and for repeat CPK level. Patient to arrange.          Total time spent including time spent on final examination and discharge discussion, discharge documentation and records reviewed and medication reconciliation: > 30 minutes    Bing Harman DO  Internal Medicine, Hospitalist  Pager: [phone] [facility]

## 2020-05-17 NOTE — DISCHARGE INSTRUCTIONS
Patient Education   Learning About Coronavirus [phone])  Coronavirus [phone]): Overview  What is coronavirus ([id number])? The coronavirus disease (COVID-19) is caused by a virus. It is an illness that was first found in [place], [place], in December 2019. It has since spread worldwide. The virus can cause fever, cough, and trouble breathing. In severe cases, it can cause pneumonia and make it hard to breathe without help. It can cause death. Coronaviruses are a large group of viruses. They cause the common cold. They also cause more serious illnesses like Middle East respiratory syndrome (MERS) and severe acute respiratory syndrome (SARS). COVID-19 is caused by a novel coronavirus. That means it's a new type that has not been seen in people before. This virus spreads person-to-person through droplets from coughing and sneezing. It can also spread when you are close to someone who is infected. And it can spread when you touch something that has the virus on it, such as a doorknob or a tabletop. What can you do to protect yourself from coronavirus (COVID-19)? The best way to protect yourself from getting sick is to:  · Avoid areas where there is an outbreak. · Avoid contact with people who may be infected. · Wash your hands often with soap or alcohol-based hand sanitizers. · Avoid crowds and try to stay at least 6 feet away from other people. · Wash your hands often, especially after you cough or sneeze. Use soap and water, and scrub for at least 20 seconds. If soap and water aren't available, use an alcohol-based hand . · Avoid touching your mouth, nose, and eyes. What can you do to avoid spreading the virus to others? To help avoid spreading the virus to others:  · Cover your mouth with a tissue when you cough or sneeze. Then throw the tissue in the trash. · Use a disinfectant to clean things that you touch often. · Stay home if you are sick or have been exposed to the virus.  Don't go to school, work, or public areas. And don't use public transportation. · If you are sick:  ? Leave your home only if you need to get medical care. But call the doctor's office first so they know you're coming. And wear a face mask, if you have one.  ? If you have a face mask, wear it whenever you're around other people. It can help stop the spread of the virus when you cough or sneeze. ? Clean and disinfect your home every day. Use household  and disinfectant wipes or sprays. Take special care to clean things that you grab with your hands. These include doorknobs, remote controls, phones, and handles on your refrigerator and microwave. And don't forget countertops, tabletops, bathrooms, and computer keyboards. When to call for help  Call 911 anytime you think you may need emergency care. For example, call if:  · You have severe trouble breathing. (You can't talk at all.)  · You have constant chest pain or pressure. · You are severely dizzy or lightheaded. · You are confused or can't think clearly. · Your face and lips have a blue color. · You pass out (lose consciousness) or are very hard to wake up. Call your doctor now if you develop symptoms such as:  · Shortness of breath. · Fever. · Cough. If you need to get care, call ahead to the doctor's office for instructions before you go. Make sure you wear a face mask, if you have one, to prevent exposing other people to the virus. Where can you get the latest information? The following health organizations are tracking and studying this virus. Their websites contain the most up-to-date information. Hayley Kolb also learn what to do if you think you may have been exposed to the virus. · U.S. Centers for Disease Control and Prevention (CDC): The CDC provides updated news about the disease and travel advice. The website also tells you how to prevent the spread of infection.  www.cdc.gov  · World Health Organization [facility]): WHO offers information about the virus outbreaks. WHO also has travel advice. www.who.int  Current as of: April 1, 2020               Content Version: 12.4  © 2006-2020 Healthwise, Incorporated. Care instructions adapted under license by your healthcare professional. If you have questions about a medical condition or this instruction, always ask your healthcare professional. [address] any warranty or liability for your use of this information. Patient Education   Coronavirus ([id number]): Care Instructions  Overview  The coronavirus disease (COVID-19) is caused by a virus. It causes a fever, a cough, and shortness of breath. It mainly spreads person-to-person through droplets from coughing and sneezing. The virus also can spread when people are in close contact with someone who is infected. Most people have mild symptoms and can take care of themselves at home. If their symptoms get worse, they may need care in a hospital. There is no medicine to fight the virus. It's important to not spread the virus to others. You need to isolate yourself while you are sick. Your doctor will tell you when you no longer need to be isolated. Leave your home only if you need to get medical care. Follow-up care is a key part of your treatment and safety. Be sure to make and go to all appointments, and call your doctor if you are having problems. It's also a good idea to know your test results and keep a list of the medicines you take. How can you care for yourself at home? · Get extra rest. It can help you feel better. · Drink plenty of fluids. This helps replace fluids lost from fever. Fluids also help ease a scratchy throat. Water, soup, fruit juice, and hot tea with lemon are good choices. · Take acetaminophen (such as Tylenol) to reduce a fever. It may also help with muscle aches. Read and follow all instructions on the label. · Sponge your body with lukewarm water to help with fever. Don't use cold water or ice.   · Use petroleum jelly on sore skin. This can help if the skin around your nose and lips becomes sore from rubbing a lot with tissues. Tips for isolation  · Wear a face mask, if you have one, when you are around other people. It can help stop the spread of the virus when you cough or sneeze. · Limit contact with people in your home. If possible, stay in a separate bedroom and use a separate bathroom. · Avoid contact with pets and other animals. · Cover your mouth and nose with a tissue when you cough or sneeze. Then throw it in the trash right away. · Wash your hands often, especially after you cough or sneeze. Use soap and water, and scrub for at least 20 seconds. If soap and water aren't available, use an alcohol-based hand . · Don't share personal household items. These include bedding, towels, cups and glasses, and eating utensils. · Clean and disinfect your home every day. Use household  and disinfectant wipes or sprays. Take special care to clean things that you grab with your hands. These include doorknobs, remote controls, phones, and handles on your refrigerator and microwave. And don't forget countertops, tabletops, bathrooms, and computer keyboards. When should you call for help? Call 911 anytime you think you may need emergency care. For example, call if you have life-threatening symptoms, such as:    · You have severe trouble breathing. (You can't talk at all.)     · You have constant chest pain or pressure.     · You are severely dizzy or lightheaded.     · You are confused or can't think clearly.     · Your face and lips have a blue color.     · You pass out (lose consciousness) or are very hard to wake up.     Call your doctor now or seek immediate medical care if:    · You have moderate trouble breathing. (You can't speak a full sentence.)     · You are coughing up blood (more than about 1 teaspoon).     · You have signs of low blood pressure.  These include feeling lightheaded; being too weak to stand; and having cold, pale, clammy skin.    Watch closely for changes in your health, and be sure to contact your doctor if:    · Your symptoms get worse.     · You are not getting better as expected.     Call before you go to the doctor's office. Follow their instructions. And wear a face mask if you have one. Current as of: April 1, 2020               Content Version: 12.4  © 2006-2020 [employer]. Care instructions adapted under license by your healthcare professional. If you have questions about a medical condition or this instruction, always ask your healthcare professional. [address] any warranty or liability for your use of this information. Patient Education        Epilepsy: Care Instructions  Your Care Instructions    Epilepsy is a common condition that causes repeated seizures. The seizures are caused by bursts of electrical activity in the brain that aren't normal. Seizures may cause problems with muscle control, movement, speech, vision, or awareness. They can be scary. Epilepsy affects each person differently. Some people have only a few seizures. Others get them more often. If you know what triggers a seizure, you may be able to avoid having one. You can take medicines to control and reduce seizures. You and your doctor will need to find the right combination, schedule, and dose of medicine. This may take time and careful changes. Seizures may get worse and happen more often over time. Follow-up care is a key part of your treatment and safety. Be sure to make and go to all appointments, and call your doctor if you are having problems. It's also a good idea to know your test results and keep a list of the medicines you take. How can you care for yourself at home? · Be safe with medicines. Take your medicines exactly as prescribed. Call your doctor if you think you are having a problem with your medicine.   · Make a treatment plan with your doctor. Be sure to follow your plan. · Try to identify and avoid things that may make you more likely to have a seizure. These may include:  ? Not getting enough sleep. ? Using drugs or alcohol. ? Being emotionally stressed. ? Skipping meals. · Keep a record of any seizures you have. Note the date, time of day, and any details about the seizure that you can remember. Your doctor can use this information to plan or adjust your medicine or other treatment. · Be sure that any doctor treating you for another condition knows that you have epilepsy. Each doctor should know what medicines you are taking, if any. · Wear a medical ID bracelet. You can buy this at most [employer]. If you have a seizure that leaves you unconscious or unable to speak for yourself, this bracelet will let those who are treating you know that you have epilepsy. · Talk to your doctor about whether it is safe for you to do certain activities, such as drive or swim. When should you call for help? Call 911 anytime you think you may need emergency care. For example, call if:    · A seizure does not stop as it normally does.     · You have new symptoms such as:  ? Numbness, tingling, or weakness on one side of your body or face. ? Vision changes. ? Trouble speaking or thinking clearly.    Call your doctor now or seek immediate medical care if:    · You have a fever.     · You have a severe headache.    Watch closely for changes in your health, and be sure to contact your doctor if:    · The normal pattern or features of your seizures change. Where can you learn more? Go to http://martha-amanda.info/  Enter X141 in the search box to learn more about \"Epilepsy: Care Instructions. \"  Current as of: November 19, 2019Content Version: 12.4  © 7738-5077 Healthwise, Incorporated. Care instructions adapted under license by [employer] (which disclaims liability or warranty for this information).  If you have questions about a medical condition or this instruction, always ask your healthcare professional. [address] any warranty or liability for your use of this information. Patient Education        Seizure: Care Instructions  Your Care Instructions    Seizures are caused by abnormal patterns of electrical signals in the brain. They are different for each person. Seizures can affect movement, speech, vision, or awareness. Some people have only slight shaking of a hand and do not pass out. Other people may pass out and have violent shaking of the whole body. Some people appear to stare into space. They are awake, but they can't respond normally. Later, they may not remember what happened. You may need tests to identify the type and cause of the seizures. A seizure may occur only once, or you may have them more than one time. Taking medicines as directed and following up with your doctor may help keep you from having more seizures. The doctor has checked you carefully, but problems can develop later. If you notice any problems or new symptoms, get medical treatment right away. Follow-up care is a key part of your treatment and safety. Be sure to make and go to all appointments, and call your doctor if you are having problems. It's also a good idea to know your test results and keep a list of the medicines you take. How can you care for yourself at home? · Be safe with medicines. Take your medicines exactly as prescribed. Call your doctor if you think you are having a problem with your medicine. · Do not do any activity that could be dangerous to you or others until your doctor says it is safe to do so. For example, do not drive a car, operate machinery, swim, or climb ladders. · Be sure that anyone treating you for any health problem knows that you have had a seizure and what medicines you are taking for it.   · Identify and avoid things that may make you more likely to have a seizure. These may include lack of sleep, alcohol or drug use, stress, or not eating. · Make sure you go to your follow-up appointment. When should you call for help? Call 911 anytime you think you may need emergency care. For example, call if:    · You have another seizure.     · You have more than one seizure in 24 hours.     · You have new symptoms, such as trouble walking, speaking, or thinking clearly.    Call your doctor now or seek immediate medical care if:    · You are not acting normally.    Watch closely for changes in your health, and be sure to contact your doctor if you have any problems. Where can you learn more? Go to http://martha-amanda.info/  Enter M769 in the search box to learn more about \"Seizure: Care Instructions. \"  Current as of: November 19, 2019Content Version: 12.4  © 8159-2256 [employer]. Care instructions adapted under license by [employer] (which disclaims liability or warranty for this information). If you have questions about a medical condition or this instruction, always ask your healthcare professional. [place] [address] any warranty or liability for your use of this information. Patient Education        Rhabdomyolysis: Care Instructions  Your Care Instructions    When you have rhabdomyolysis (say \"dpe-erl-nq-AH-selene-suss\"), dying muscle cells cause toxins to build up in the blood. If not treated, it can cause life-threatening damage to the body's organs. It can be caused by many things, such as severe muscle injury, some medicines (like statins), the flu, and certain blood infections. Symptoms may include weak muscles, pain, stiffness, fever, and nausea. Your urine may also be dark. You will get treatment in the hospital. If possible, the doctor will stop the cause of muscle cell death. The doctor will take steps to protect your organs.  You may have to stop taking certain medicines if they are the cause of the problem. You will also get treatment to help the kidneys remove the toxins from your blood. This includes plenty of fluids. You may get fluids through a vein (by IV). You may also need dialysis. Follow-up care is a key part of your treatment and safety. Be sure to make and go to all appointments, and call your doctor if you are having problems. It's also a good idea to know your test results and keep a list of the medicines you take. How can you care for yourself at home? · Take pain medicines exactly as directed. ? If the doctor gave you a prescription medicine for pain, take it as prescribed. ? If you are not taking a prescription pain medicine, ask your doctor if you can take an over-the-counter medicine. · Talk to your doctor about whether you need to stop taking any medicines. Follow your doctor's instructions about stopping medicines. · Drink plenty of fluids, enough so that your urine is light yellow or clear like water. If you have kidney, heart, or liver disease and have to limit fluids, talk with your doctor before you increase the amount of fluids you drink. When should you call for help? Call your doctor now or seek immediate medical care if:    · You have new or worse muscle pain.     · You have less urine than normal or no urine.     · You have new swelling in your arms or feet.     · You have blood in your urine.    Watch closely for changes in your health, and be sure to contact your doctor if you do not get better as expected. Where can you learn more? Go to http://martha-amanda.info/  Enter F129 in the search box to learn more about \"Rhabdomyolysis: Care Instructions. \"  Current as of: August 11, 2019Content Version: 12.4  © 6711-0641 Healthwise, Incorporated. Care instructions adapted under license by [employer] (which disclaims liability or warranty for this information).  If you have questions about a medical condition or this instruction, always ask your healthcare professional. [place] [address] any warranty or liability for your use of this information.

## 2020-05-17 NOTE — PROGRESS NOTES
Pt in bed resting alert and oriented x 4  denies pain at the moment. Assessement completed plan of care for the shift explained pt verbalized understanding. IVF infusing well. HOB elevated, bed low and in locked position. Call light and frequently used items within reach. 2210- Pt awake reading  No concerns voiced. 2110- Charge nurse at bedside connecting pt to monitor. Pt resting reading no concerns voiced. Vitals taken  2325- Pt's bed linen straightened, bed wanted to go to sleep   0130- Pt sleeping. 0230 Pt sleeping  0315- Pt sleeping not in distress. 0425- Pt sleeping  0510 - Pt awake using BSC - Pt reported that iv came out. Bed linen changed pt made comfortable in bed. No concerns voiced. 0630- Pt awake resting. IVF infusing well. Uneventful night. 0728-Bedside and Verbal shift change report given to JAYLAN Blackwell (oncoming nurse) by Leah Hanna RN (offgoing nurse). Report included the following information SBAR, Kardex, Intake/Output, MAR and Recent Results.

## 2020-05-17 NOTE — PROGRESS NOTES
0730: Verbal shift change report given to Rishi TIAN  (oncoming nurse) by Adrian Morales RN  (offgoing nurse). Report included the following information SBAR, Kardex, Procedure Summary, Intake/Output, MAR and Cardiac Rhythm SR. Patient resting in bed.     0800: Assessment Completed on patient. Patient resting in bed. Call light in reach, bed locked and in lowest position. Patient stated having pain 8/10, generalized. 0900: Medications given. Patient assisted to bedside commode and back to bed. Patient sat up for breakfast. Patient resting in bed. Call light in reach, bed locked and in lowest position. 4933: Patient's Right arm swollen. IVF stopped. Patient wanted to perform self care. Paged MD about COVID test and IV site Infiltrated, and wanting to clarify if patient's needs another IV site or possible D/C today due to MD previous note. 1030: Spoke with MD about previous entry. MD stated potential discharge and no need to restart IV for now. MD also stated could take patient off of Droplet Plus Precautions. Nursing Supervisor Informed. 1050: Hourly Rounding: Patient resting in bed. Call light in reach, bed locked and in lowest position. States pain is tolerable. 1115: Hourly Rounding: Patient is resting in bed. Patient states that she has not received her flu vaccine, but refused it. Leads replaced. Call light in reach, bed locked and in lowest position. 1230: Spoke with patient about discharge orders. Patient refused Flu vaccine, informed MD that patient is in need of prescription for Keppra, MD sent prescriptions off to pharmacy in patient's chart. Patient states that she goes to a new pharmacy. Paged MD about change. Paged  about patient not having PCP listed. Patient states she goes to an OB-GYN for medications and Visits.  stated patient has a PCP assigned from insurance and provided phone number for patient.      1320: MD made aware of change and Pharmacy changed in the chart.     1400: Discharge Instruction Provided to patient. Patient was informed to make an appointment within a week with a PCP. Patient states that she already as appointment with Marlen Mahoney for this week. Gave patient information from  about PCP from insurance. Patient informed on where to  medications. Patient received information about COVID 19, Seizure, Rhabdo and Keppra. Patient signed for self. IV and Tele discontinued. 1420: Patient wheeled down in wheelchair via RN.

## 2020-05-17 NOTE — PROGRESS NOTES
Problem: Falls - Risk of  Goal: *Absence of Falls  Description: Document Danne Lobe Fall Risk and appropriate interventions in the flowsheet.   Outcome: Resolved/Met  Note: Fall Risk Interventions:       Mentation Interventions: Adequate sleep, hydration, pain control    Medication Interventions: Patient to call before getting OOB    Elimination Interventions: Call light in reach              Problem: Patient Education: Go to Patient Education Activity  Goal: Patient/Family Education  Outcome: Resolved/Met     Problem: Patient Education: Go to Patient Education Activity  Goal: Patient/Family Education  Outcome: Resolved/Met     Problem: Seizure Disorder (Adult)  Goal: *STG: Remains free of seizure activity  Outcome: Resolved/Met  Goal: *STG: Maintains lab values within therapeutic range  Outcome: Resolved/Met  Goal: *STG/LTG: Complies with medication therapy  Outcome: Resolved/Met  Goal: *STG: Remains free of injury during seizure activity  Outcome: Resolved/Met  Goal: *STG: Remains safe in hospital  Outcome: Resolved/Met  Goal: Interventions  Outcome: Resolved/Met     Problem: Patient Education: Go to Patient Education Activity  Goal: Patient/Family Education  Outcome: Resolved/Met     Problem: Pain  Goal: *Control of Pain  Outcome: Resolved/Met

## 2020-05-17 NOTE — PROGRESS NOTES
Problem: Falls - Risk of  Goal: *Absence of Falls  Description: Document Jacquie Leahy Fall Risk and appropriate interventions in the flowsheet.   Outcome: Progressing Towards Goal  Note: Fall Risk Interventions:       Mentation Interventions: Adequate sleep, hydration, pain control, More frequent rounding    Medication Interventions: Bed/chair exit alarm    Elimination Interventions: Call light in reach, Patient to call for help with toileting needs     Problem: Patient Education: Go to Patient Education Activity  Goal: Patient/Family Education  Outcome: Progressing Towards Goal     Problem: Patient Education: Go to Patient Education Activity  Goal: Patient/Family Education  Outcome: Progressing Towards Goal     Problem: Seizure Disorder (Adult)  Goal: *STG: Remains free of seizure activity  Outcome: Progressing Towards Goal  Goal: *STG: Maintains lab values within therapeutic range  Outcome: Progressing Towards Goal  Goal: *STG/LTG: Complies with medication therapy  Outcome: Progressing Towards Goal  Goal: *STG: Remains free of injury during seizure activity  Outcome: Progressing Towards Goal  Goal: *STG: Remains safe in hospital  Outcome: Progressing Towards Goal  Goal: Interventions  Outcome: Progressing Towards Goal     Problem: Patient Education: Go to Patient Education Activity  Goal: Patient/Family Education  Outcome: Progressing Towards Goal     Problem: Pain  Goal: *Control of Pain  Outcome: Progressing Towards Goal

## 2020-05-17 NOTE — PROGRESS NOTES
Noted orders for discharge, no services ordered or indicated. Perri Payne RN,ext [phone]. Care Management Interventions  PCP Verified by CM: No  Mode of Transport at Discharge: Other (see comment)(family)  Transition of Care Consult (CM Consult): Discharge Planning  Current Support Network: Relative's Home  Confirm Follow Up Transport: Self  The Plan for Transition of Care is Related to the Following Treatment Goals : resolution of acute symptoms   The Patient and/or Patient Representative was Provided with a Choice of Provider and Agrees with the Discharge Plan?: No  Freedom of Choice List was Provided with Basic Dialogue that Supports the Patient's Individualized Plan of Care/Goals, Treatment Preferences and Shares the Quality Data Associated with the Providers?: No   Resource Information Provided?: No  Discharge Location  Discharge Placement: Home     Received call from pt's nurse, stating pt does not have PCP, states she gets her care & RX for Keppra from her OB/GYN,  Ulisses Son. 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 Provided her with name & number of PCP assigned to pt on her [facility] card: Thiago Patten: [phone]. Perri Payne RN,ext [phone].

## 2020-05-18 LAB
BACTERIA SPEC CULT: NORMAL
BACTERIA SPEC CULT: NORMAL
SERVICE CMNT-IMP: NORMAL
SERVICE CMNT-IMP: NORMAL

## 2020-05-20 LAB
BACTERIA SPEC CULT: NORMAL
GRAM STN SPEC: NORMAL
GRAM STN SPEC: NORMAL
SERVICE CMNT-IMP: NORMAL

## 2021-04-22 PROBLEM — R56.9 SEIZURE (HCC): Status: ACTIVE | Noted: 2021-04-22

## 2022-03-19 PROBLEM — R56.9 SEIZURE (HCC): Status: ACTIVE | Noted: 2021-04-22

## 2022-03-19 PROBLEM — I21.4 NSTEMI (NON-ST ELEVATED MYOCARDIAL INFARCTION) (HCC): Status: ACTIVE | Noted: 2020-05-17

## 2025-07-27 NOTE — PROGRESS NOTES
Internal Medicine Progress Note    Patient's Name: Belkis Andrews Date: 5/12/2020  Length of Stay: 4      Assessment/Plan     Active Hospital Problems    Diagnosis Date Noted    NSTEMI (non-ST elevated myocardial infarction) ([facility] [address] 05/17/2020    Seizures ([facility] [address]     Fever     Acute metabolic encephalopathy     Rhabdomyolysis    Sepsis ruled out  SIRS without source of infection    - No further seizures  - Keppra levels WNL  - Cont PO Keppra  - Low suspicion for CoVID-19, but ordered in ED, still awaiting results  - Cont to observe off antibx  - CPK still > 50,000  - Etiology for rhabdo, unclear, low likelihood of keppra (although scattered case reports), although suspect drug (THC in UDS)  - Cont IV fluids for rhabdo  - Would like to see trend downward before discharge as she's tolerating diet fine and once trending down, should be ok for d/c  - Trend CPK  - Mildly elevated trop at admission likely 2/2 demand  - Cont acceptable home medications for chronic conditions   - DVT protocol    I have personally reviewed all pertinent labs and films that have officially resulted over the last 24 hours. I have personally checked for all pending labs that are awaiting final results.     Subjective     Pt s/e @ bedside  No major events overnight  No seizures  No complaints today  Denies CP or SOB    Objective     Visit Vitals  /81   Pulse 67   Temp 97.6 °F (36.4 °C)   Resp 18   Ht 5' 8\" (1.727 m)   Wt 72 kg (158 lb 11.7 oz)   SpO2 97%   BMI 24.14 kg/m²       Physical Exam:  General Appearance: NAD, conversant  Lungs: CTA with normal respiratory effort  CV: RRR, no m/r/g  Abdomen: soft, non-tender, normal bowel sounds  Extremities: no cyanosis, no peripheral edema  Neuro: No focal deficits, motor/sensory intact    Lab/Data Reviewed:  BMP:   Lab Results   Component Value Date/Time     05/16/2020 04:50 AM    K 3.5 05/16/2020 04:50 AM     (H) 05/16/2020 04:50 AM    CO2 24 05/16/2020 04:50 AM    AGAP 6 Progress Note    Date: 07/27/25    Subjective: Mr Ceja seen at bedside. No new symptoms. Had some temps ~99 but not higher.     Objective:  Vitals:    07/27/25 1345 07/27/25 1558 07/27/25 1612 07/27/25 1855   BP:   111/61 (!) 115/56   Pulse:   71 68   Resp: 18  18 18   Temp:   99.3 °F (37.4 °C) 99.9 °F (37.7 °C)   TempSrc:   Oral Oral   SpO2:  97% 96% 95%   Weight:       Height:           Physical Exam:  Gen: No distress. Alert.   Eyes: PERRL. No sclera icterus. No conjunctival injection.   ENT: No discharge. Pharynx clear.   Neck: No JVD.  Trachea midline.  Resp: No accessory muscle use. No crackles. No wheezes. No rhonchi.   +right chest wall port.   CV: Regular rate. Regular rhythm. No murmur.  No rub. 2+ edema bilateral LE.   Capillary Refill: Brisk,< 3 seconds   Peripheral Pulses: +2 palpable, equal bilaterally   GI: Abdomen distended. Non-tender. Normal bowel sounds.  Bilateral biliary drains present.  Skin: Warm and dry. No nodule on exposed extremities. No rash on exposed extremities.   M/S: No cyanosis. No joint deformity. No clubbing.   Neuro: Awake. Grossly nonfocal      Labs:  CBC:   Recent Labs     07/25/25  0525 07/25/25  0529 07/27/25  0455   WBC  --  4.0 5.1   HGB 7.1* 7.1* 7.1*   HCT 21.8* 22.1* 21.2*   MCV  --  86.1  --    PLT  --  177  --      BMP:   Recent Labs     07/25/25  0525 07/26/25  0444 07/27/25  0455    132* 134*   K 3.5 3.2* 3.0*   CL 96* 92* 93*   CO2 28 30 29   BUN 12 11 11   CREATININE 0.4* 0.4* 0.4*     LIVER PROFILE:   No results for input(s): \"AST\", \"ALT\", \"LIPASE\", \"AMYLASE\", \"BILIDIR\", \"BILITOT\", \"ALKPHOS\" in the last 72 hours.    Invalid input(s): \"ALB\"    PT/INR:   No results for input(s): \"PROTIME\", \"INR\" in the last 72 hours.    APTT: No results for input(s): \"APTT\" in the last 72 hours.  UA:  Recent Labs     07/25/25  1411   COLORU Yellow   PHUR 6.0   WBCUA None seen   RBCUA *   BACTERIA Rare*   CLARITYU Clear   LEUKOCYTESUR Negative   UROBILINOGEN 0.2  05/16/2020 04:50 AM    GLU 91 05/16/2020 04:50 AM    BUN 4 (L) 05/16/2020 04:50 AM    CREA 0.81 05/16/2020 04:50 AM    GFRAA >60 05/16/2020 04:50 AM    GFRNA >60 05/16/2020 04:50 AM     CBC:   No results found for: WBC, HGB, HGBEXT, HCT, HCTEXT, PLT, PLTEXT, HGBEXT, HCTEXT, PLTEXT    Imaging Reviewed:  No results found.     Medications Reviewed:  Current Facility-Administered Medications   Medication Dose Route Frequency    influenza vaccine 2019-20 (6 mos+)(PF) (FLUARIX/FLULAVAL/FLUZONE QUAD) injection 0.5 mL  0.5 mL IntraMUSCular PRIOR TO DISCHARGE    levETIRAcetam (KEPPRA) tablet 250 mg  250 mg Oral BID    sodium chloride (NS) flush 5-10 mL  5-10 mL IntraVENous PRN    acetaminophen (TYLENOL) tablet 650 mg  650 mg Oral Q4H PRN    enoxaparin (LOVENOX) injection 40 mg  40 mg SubCUTAneous Q24H    dextrose 5% and 0.9% NaCl infusion  150 mL/hr IntraVENous CONTINUOUS    sodium chloride (NS) flush 5-40 mL  5-40 mL IntraVENous Q8H    LORazepam (ATIVAN) injection 4 mg  4 mg IntraVENous Q10MIN PRN           Sean Wallace DO  Internal Medicine, Hospitalist  Pager: [phone]  [address] [place] [address] ago was WNL.  -protein supplements ordered.    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  Portal vein thrombosis.  Coagulopathy, on daily vitamin K.  -thrombosis is chronic, unchanged on CT.  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  -palliative care consulted during previous admission, family has refused hospice and has unrealistic expectations.  -continue home Zenpep and Vitamin K.  -Tarcev is on hold.    Lung nodule.  -CT PE showed ill-defined nodule around the right upper lobe.  -continue outpatient follow up with oncology.    Type II diabetes mellitus.  -A1C 5.8% from 7/4/2025.  -SSI.  -POC Glucose, carb control diet.     Physical deconditioning.   -patient was recently hospitalized for 11 days due to anemia, stercoral colitis, E. coli bacteremia was evaluated by oncology and discharged on Augmentin for 4 additional days which she has completed by now.   -PT/OT recommended SNF last admission.  -continue Keflex - on hold for zosyn    Acute on chronic normocytic anemia, recurrent.  -hemoglobin 7.2, baseline 7-8.  -monitor with daily CBC.   -required PRBC during previous admission, thought to be 2/2 Tarceva, initially held, but resumed after discussion with his oncologist, although patient reports never restarted.   -SCDs for DVT prophylaxis.    -Monitor every other day     Vitamin D deficiency.  -on supplementation 3x weekly.    Gout.  -continue allopurinol.      Fevers, recurrent. Recent E. Coli bacteremia  CT AP showed mild ascites  CXR - negative  Blood Cx sent - so far no growth  UA repeated for dysuria - RBCs but otherwise neg  Denies diarrhea or skin wounds  Send urine cx - pending - no growth to date  Zosyn empirically pending further results  ID consulted for further recs    Dysuria  Unclear etiology, UA not indicative of infection, +RBCs, but still with burning, ordered Cx  Urology consulted, may need cysto    DVT Prophylaxis: SCDs  Diet: